# Patient Record
Sex: MALE | Race: WHITE
[De-identification: names, ages, dates, MRNs, and addresses within clinical notes are randomized per-mention and may not be internally consistent; named-entity substitution may affect disease eponyms.]

---

## 2021-03-28 ENCOUNTER — HOSPITAL ENCOUNTER (INPATIENT)
Dept: HOSPITAL 4 - SED | Age: 71
LOS: 5 days | Discharge: HOME HEALTH | DRG: 300 | End: 2021-04-02
Attending: FAMILY MEDICINE | Admitting: FAMILY MEDICINE
Payer: COMMERCIAL

## 2021-03-28 VITALS — SYSTOLIC BLOOD PRESSURE: 160 MMHG

## 2021-03-28 VITALS — HEIGHT: 65 IN | BODY MASS INDEX: 29.16 KG/M2 | WEIGHT: 175 LBS

## 2021-03-28 VITALS — SYSTOLIC BLOOD PRESSURE: 165 MMHG

## 2021-03-28 VITALS — SYSTOLIC BLOOD PRESSURE: 117 MMHG

## 2021-03-28 DIAGNOSIS — L03.114: ICD-10-CM

## 2021-03-28 DIAGNOSIS — Z82.49: ICD-10-CM

## 2021-03-28 DIAGNOSIS — Z20.822: ICD-10-CM

## 2021-03-28 DIAGNOSIS — I82.A12: Primary | ICD-10-CM

## 2021-03-28 DIAGNOSIS — Z79.899: ICD-10-CM

## 2021-03-28 DIAGNOSIS — M17.11: ICD-10-CM

## 2021-03-28 DIAGNOSIS — E11.9: ICD-10-CM

## 2021-03-28 DIAGNOSIS — I82.612: ICD-10-CM

## 2021-03-28 DIAGNOSIS — Z83.3: ICD-10-CM

## 2021-03-28 DIAGNOSIS — I10: ICD-10-CM

## 2021-03-28 LAB
ALBUMIN SERPL BCP-MCNC: 3.3 G/DL (ref 3.4–4.8)
ALT SERPL W P-5'-P-CCNC: 16 U/L (ref 12–78)
ANION GAP SERPL CALCULATED.3IONS-SCNC: 9 MMOL/L (ref 5–15)
AST SERPL W P-5'-P-CCNC: 13 U/L (ref 10–37)
BASOPHILS # BLD AUTO: 0.1 K/UL (ref 0–0.2)
BASOPHILS NFR BLD AUTO: 1 % (ref 0–2)
BILIRUB SERPL-MCNC: 0.4 MG/DL (ref 0–1)
BUN SERPL-MCNC: 15 MG/DL (ref 8–21)
CALCIUM SERPL-MCNC: 9 MG/DL (ref 8.4–11)
CHLORIDE SERPL-SCNC: 100 MMOL/L (ref 98–107)
CREAT SERPL-MCNC: 0.87 MG/DL (ref 0.55–1.3)
EOSINOPHIL # BLD AUTO: 0.4 K/UL (ref 0–0.4)
EOSINOPHIL NFR BLD AUTO: 4.2 % (ref 0–4)
ERYTHROCYTE [DISTWIDTH] IN BLOOD BY AUTOMATED COUNT: 16.3 % (ref 9–15)
GFR SERPL CREATININE-BSD FRML MDRD: (no result) ML/MIN (ref 90–?)
GLUCOSE SERPL-MCNC: 137 MG/DL (ref 70–99)
HCT VFR BLD AUTO: 39.6 % (ref 36–54)
HGB BLD-MCNC: 12.8 G/DL (ref 14–18)
INR PPP: 1 (ref 0.8–1.2)
LYMPHOCYTES # BLD AUTO: 1.6 K/UL (ref 1–5.5)
LYMPHOCYTES NFR BLD AUTO: 16 % (ref 20.5–51.5)
MCH RBC QN AUTO: 21 PG (ref 27–31)
MCHC RBC AUTO-ENTMCNC: 32 % (ref 32–36)
MCV RBC AUTO: 65 FL (ref 79–98)
MONOCYTES # BLD MANUAL: 1 K/UL (ref 0–1)
MONOCYTES # BLD MANUAL: 9.8 % (ref 1.7–9.3)
NEUTROPHILS # BLD AUTO: 7 K/UL (ref 1.8–7.7)
NEUTROPHILS NFR BLD AUTO: 69 % (ref 40–70)
PLATELET # BLD AUTO: 431 K/UL (ref 130–430)
POTASSIUM SERPL-SCNC: 4 MMOL/L (ref 3.5–5.1)
PROTHROMBIN TIME: 10.2 SECS (ref 9.5–12.5)
RBC # BLD AUTO: 6.09 MIL/UL (ref 4.2–6.2)
SODIUM SERPLBLD-SCNC: 135 MMOL/L (ref 136–145)
WBC # BLD AUTO: 10.1 K/UL (ref 4.8–10.8)

## 2021-03-28 PROCEDURE — G0378 HOSPITAL OBSERVATION PER HR: HCPCS

## 2021-03-28 RX ADMIN — TRAMADOL HYDROCHLORIDE PRN MG: 50 TABLET, FILM COATED ORAL at 22:28

## 2021-03-28 RX ADMIN — Medication SCH EA: at 18:00

## 2021-03-28 RX ADMIN — Medication SCH EA: at 23:55

## 2021-03-29 VITALS — SYSTOLIC BLOOD PRESSURE: 142 MMHG

## 2021-03-29 VITALS — SYSTOLIC BLOOD PRESSURE: 156 MMHG

## 2021-03-29 VITALS — SYSTOLIC BLOOD PRESSURE: 145 MMHG

## 2021-03-29 VITALS — SYSTOLIC BLOOD PRESSURE: 137 MMHG

## 2021-03-29 LAB
ALBUMIN SERPL BCP-MCNC: 3.2 G/DL (ref 3.4–4.8)
ALT SERPL W P-5'-P-CCNC: 22 U/L (ref 12–78)
ANION GAP SERPL CALCULATED.3IONS-SCNC: 11 MMOL/L (ref 5–15)
AST SERPL W P-5'-P-CCNC: 16 U/L (ref 10–37)
BASOPHILS # BLD AUTO: 0.1 K/UL (ref 0–0.2)
BASOPHILS NFR BLD AUTO: 1.3 % (ref 0–2)
BILIRUB SERPL-MCNC: 0.5 MG/DL (ref 0–1)
BUN SERPL-MCNC: 13 MG/DL (ref 8–21)
CALCIUM SERPL-MCNC: 8.6 MG/DL (ref 8.4–11)
CHLORIDE SERPL-SCNC: 100 MMOL/L (ref 98–107)
CHOLEST SERPL-MCNC: 107 MG/DL (ref ?–200)
CREAT SERPL-MCNC: 0.72 MG/DL (ref 0.55–1.3)
EOSINOPHIL # BLD AUTO: 0.5 K/UL (ref 0–0.4)
EOSINOPHIL NFR BLD AUTO: 4.6 % (ref 0–4)
ERYTHROCYTE [DISTWIDTH] IN BLOOD BY AUTOMATED COUNT: 16.2 % (ref 9–15)
GFR SERPL CREATININE-BSD FRML MDRD: (no result) ML/MIN (ref 90–?)
GLUCOSE SERPL-MCNC: 103 MG/DL (ref 70–99)
HCT VFR BLD AUTO: 42.3 % (ref 36–54)
HDLC SERPL-MCNC: 52 MG/DL (ref 45–?)
HGB BLD-MCNC: 13.3 G/DL (ref 14–18)
INR PPP: 1 (ref 0.8–1.2)
LDL CHOLESTEROL: 47 MG/DL (ref ?–100)
LYMPHOCYTES # BLD AUTO: 2.1 K/UL (ref 1–5.5)
LYMPHOCYTES NFR BLD AUTO: 18.8 % (ref 20.5–51.5)
MCH RBC QN AUTO: 21 PG (ref 27–31)
MCHC RBC AUTO-ENTMCNC: 31 % (ref 32–36)
MCV RBC AUTO: 65 FL (ref 79–98)
MONOCYTES # BLD MANUAL: 1 K/UL (ref 0–1)
MONOCYTES # BLD MANUAL: 8.5 % (ref 1.7–9.3)
NEUTROPHILS # BLD AUTO: 7.5 K/UL (ref 1.8–7.7)
NEUTROPHILS NFR BLD AUTO: 66.8 % (ref 40–70)
PLATELET # BLD AUTO: 429 K/UL (ref 130–430)
POTASSIUM SERPL-SCNC: 3.7 MMOL/L (ref 3.5–5.1)
PROTHROMBIN TIME: 10.2 SECS (ref 9.5–12.5)
RBC # BLD AUTO: 6.48 MIL/UL (ref 4.2–6.2)
SODIUM SERPLBLD-SCNC: 137 MMOL/L (ref 136–145)
TRIGL SERPL-MCNC: 102 MG/DL (ref 30–150)
TSH SERPL DL<=0.05 MIU/L-ACNC: 5.89 UIU/ML (ref 0.36–3.74)
WBC # BLD AUTO: 11.3 K/UL (ref 4.8–10.8)

## 2021-03-29 RX ADMIN — ENOXAPARIN SODIUM SCH MG: 60 INJECTION SUBCUTANEOUS at 20:17

## 2021-03-29 RX ADMIN — Medication SCH EA: at 17:41

## 2021-03-29 RX ADMIN — ENOXAPARIN SODIUM SCH MG: 60 INJECTION SUBCUTANEOUS at 08:50

## 2021-03-29 RX ADMIN — Medication SCH EA: at 11:44

## 2021-03-29 RX ADMIN — CILOSTAZOL SCH MG: 50 TABLET ORAL at 20:17

## 2021-03-29 RX ADMIN — TRAMADOL HYDROCHLORIDE PRN MG: 50 TABLET, FILM COATED ORAL at 07:51

## 2021-03-29 RX ADMIN — Medication SCH EA: at 06:39

## 2021-03-29 RX ADMIN — DEXTROSE MONOHYDRATE SCH MLS/HR: 50 INJECTION, SOLUTION INTRAVENOUS at 20:18

## 2021-03-29 RX ADMIN — Medication SCH EA: at 23:04

## 2021-03-29 RX ADMIN — NAPROXEN SCH MG: 250 TABLET ORAL at 20:15

## 2021-03-29 RX ADMIN — BACLOFEN SCH MG: 10 TABLET ORAL at 20:15

## 2021-03-29 RX ADMIN — MORPHINE SULFATE PRN MG: 2 INJECTION, SOLUTION INTRAMUSCULAR; INTRAVENOUS at 21:18

## 2021-03-30 VITALS — SYSTOLIC BLOOD PRESSURE: 131 MMHG

## 2021-03-30 VITALS — SYSTOLIC BLOOD PRESSURE: 113 MMHG

## 2021-03-30 VITALS — SYSTOLIC BLOOD PRESSURE: 145 MMHG

## 2021-03-30 VITALS — SYSTOLIC BLOOD PRESSURE: 139 MMHG

## 2021-03-30 VITALS — SYSTOLIC BLOOD PRESSURE: 134 MMHG

## 2021-03-30 VITALS — SYSTOLIC BLOOD PRESSURE: 151 MMHG

## 2021-03-30 RX ADMIN — ENOXAPARIN SODIUM SCH MG: 60 INJECTION SUBCUTANEOUS at 08:43

## 2021-03-30 RX ADMIN — BACLOFEN SCH MG: 10 TABLET ORAL at 20:07

## 2021-03-30 RX ADMIN — BACLOFEN SCH MG: 10 TABLET ORAL at 08:42

## 2021-03-30 RX ADMIN — NAPROXEN SCH MG: 250 TABLET ORAL at 20:07

## 2021-03-30 RX ADMIN — NAPROXEN SCH MG: 250 TABLET ORAL at 10:25

## 2021-03-30 RX ADMIN — Medication SCH EA: at 12:15

## 2021-03-30 RX ADMIN — Medication SCH EA: at 05:31

## 2021-03-30 RX ADMIN — Medication SCH MG: at 08:41

## 2021-03-30 RX ADMIN — Medication SCH EA: at 18:10

## 2021-03-30 RX ADMIN — BACLOFEN SCH MG: 10 TABLET ORAL at 15:46

## 2021-03-30 RX ADMIN — ENOXAPARIN SODIUM SCH MG: 60 INJECTION SUBCUTANEOUS at 20:09

## 2021-03-30 RX ADMIN — MORPHINE SULFATE PRN MG: 2 INJECTION, SOLUTION INTRAMUSCULAR; INTRAVENOUS at 16:23

## 2021-03-30 RX ADMIN — Medication SCH EA: at 23:37

## 2021-03-30 RX ADMIN — ATORVASTATIN CALCIUM SCH MG: 10 TABLET, FILM COATED ORAL at 08:42

## 2021-03-30 RX ADMIN — CILOSTAZOL SCH MG: 50 TABLET ORAL at 08:41

## 2021-03-30 RX ADMIN — ALLOPURINOL SCH MG: 100 TABLET ORAL at 08:42

## 2021-03-30 RX ADMIN — DEXTROSE MONOHYDRATE SCH MLS/HR: 50 INJECTION, SOLUTION INTRAVENOUS at 20:13

## 2021-03-30 RX ADMIN — CILOSTAZOL SCH MG: 50 TABLET ORAL at 20:08

## 2021-03-31 VITALS — SYSTOLIC BLOOD PRESSURE: 129 MMHG

## 2021-03-31 VITALS — SYSTOLIC BLOOD PRESSURE: 140 MMHG

## 2021-03-31 VITALS — SYSTOLIC BLOOD PRESSURE: 145 MMHG

## 2021-03-31 RX ADMIN — Medication SCH EA: at 23:51

## 2021-03-31 RX ADMIN — ALLOPURINOL SCH MG: 100 TABLET ORAL at 09:47

## 2021-03-31 RX ADMIN — Medication SCH EA: at 17:35

## 2021-03-31 RX ADMIN — BACLOFEN SCH MG: 10 TABLET ORAL at 09:46

## 2021-03-31 RX ADMIN — BACLOFEN SCH MG: 10 TABLET ORAL at 21:23

## 2021-03-31 RX ADMIN — DEXTROSE MONOHYDRATE SCH MLS/HR: 50 INJECTION, SOLUTION INTRAVENOUS at 21:22

## 2021-03-31 RX ADMIN — Medication SCH EA: at 05:46

## 2021-03-31 RX ADMIN — Medication SCH EA: at 12:00

## 2021-03-31 RX ADMIN — ATORVASTATIN CALCIUM SCH MG: 10 TABLET, FILM COATED ORAL at 09:45

## 2021-03-31 RX ADMIN — ENOXAPARIN SODIUM SCH MG: 60 INJECTION SUBCUTANEOUS at 09:51

## 2021-03-31 RX ADMIN — CILOSTAZOL SCH MG: 50 TABLET ORAL at 09:45

## 2021-03-31 RX ADMIN — ENOXAPARIN SODIUM SCH MG: 60 INJECTION SUBCUTANEOUS at 21:27

## 2021-03-31 RX ADMIN — BACLOFEN SCH MG: 10 TABLET ORAL at 17:34

## 2021-03-31 RX ADMIN — NAPROXEN SCH MG: 250 TABLET ORAL at 09:45

## 2021-03-31 RX ADMIN — CILOSTAZOL SCH MG: 50 TABLET ORAL at 21:22

## 2021-03-31 RX ADMIN — NAPROXEN SCH MG: 250 TABLET ORAL at 21:23

## 2021-03-31 RX ADMIN — Medication SCH MG: at 09:45

## 2021-04-01 VITALS — SYSTOLIC BLOOD PRESSURE: 146 MMHG

## 2021-04-01 VITALS — SYSTOLIC BLOOD PRESSURE: 112 MMHG

## 2021-04-01 VITALS — SYSTOLIC BLOOD PRESSURE: 132 MMHG

## 2021-04-01 VITALS — SYSTOLIC BLOOD PRESSURE: 130 MMHG

## 2021-04-01 VITALS — SYSTOLIC BLOOD PRESSURE: 150 MMHG

## 2021-04-01 RX ADMIN — Medication SCH EA: at 05:59

## 2021-04-01 RX ADMIN — BACLOFEN SCH MG: 10 TABLET ORAL at 09:04

## 2021-04-01 RX ADMIN — ENOXAPARIN SODIUM SCH MG: 60 INJECTION SUBCUTANEOUS at 09:07

## 2021-04-01 RX ADMIN — CILOSTAZOL SCH MG: 50 TABLET ORAL at 09:06

## 2021-04-01 RX ADMIN — Medication SCH EA: at 17:10

## 2021-04-01 RX ADMIN — NAPROXEN SCH MG: 250 TABLET ORAL at 09:04

## 2021-04-01 RX ADMIN — BACLOFEN SCH MG: 10 TABLET ORAL at 22:02

## 2021-04-01 RX ADMIN — BACLOFEN SCH MG: 10 TABLET ORAL at 16:30

## 2021-04-01 RX ADMIN — Medication SCH MG: at 09:05

## 2021-04-01 RX ADMIN — Medication SCH EA: at 12:58

## 2021-04-01 RX ADMIN — ATORVASTATIN CALCIUM SCH MG: 10 TABLET, FILM COATED ORAL at 09:05

## 2021-04-01 RX ADMIN — ALLOPURINOL SCH MG: 100 TABLET ORAL at 09:05

## 2021-04-01 RX ADMIN — APIXABAN SCH MG: 2.5 TABLET, FILM COATED ORAL at 21:00

## 2021-04-01 RX ADMIN — CILOSTAZOL SCH MG: 50 TABLET ORAL at 22:02

## 2021-04-01 RX ADMIN — DEXTROSE MONOHYDRATE SCH MLS/HR: 50 INJECTION, SOLUTION INTRAVENOUS at 22:00

## 2021-04-01 RX ADMIN — NAPROXEN SCH MG: 250 TABLET ORAL at 22:01

## 2021-04-02 VITALS — SYSTOLIC BLOOD PRESSURE: 134 MMHG

## 2021-04-02 VITALS — SYSTOLIC BLOOD PRESSURE: 142 MMHG

## 2021-04-02 VITALS — SYSTOLIC BLOOD PRESSURE: 140 MMHG

## 2021-04-02 VITALS — SYSTOLIC BLOOD PRESSURE: 149 MMHG

## 2021-04-02 LAB
ANION GAP SERPL CALCULATED.3IONS-SCNC: 11 MMOL/L (ref 5–15)
BASOPHILS # BLD AUTO: 0.1 K/UL (ref 0–0.2)
BASOPHILS NFR BLD AUTO: 1.1 % (ref 0–2)
BUN SERPL-MCNC: 13 MG/DL (ref 8–21)
CALCIUM SERPL-MCNC: 8.9 MG/DL (ref 8.4–11)
CHLORIDE SERPL-SCNC: 100 MMOL/L (ref 98–107)
CREAT SERPL-MCNC: 0.69 MG/DL (ref 0.55–1.3)
EOSINOPHIL # BLD AUTO: 0.5 K/UL (ref 0–0.4)
EOSINOPHIL NFR BLD AUTO: 4.8 % (ref 0–4)
ERYTHROCYTE [DISTWIDTH] IN BLOOD BY AUTOMATED COUNT: 16 % (ref 9–15)
GFR SERPL CREATININE-BSD FRML MDRD: (no result) ML/MIN (ref 90–?)
GLUCOSE SERPL-MCNC: 122 MG/DL (ref 70–99)
HCT VFR BLD AUTO: 41.8 % (ref 36–54)
HGB BLD-MCNC: 13.2 G/DL (ref 14–18)
LYMPHOCYTES # BLD AUTO: 2 K/UL (ref 1–5.5)
LYMPHOCYTES NFR BLD AUTO: 19.7 % (ref 20.5–51.5)
MCH RBC QN AUTO: 21 PG (ref 27–31)
MCHC RBC AUTO-ENTMCNC: 32 % (ref 32–36)
MCV RBC AUTO: 65 FL (ref 79–98)
MONOCYTES # BLD MANUAL: 0.9 K/UL (ref 0–1)
MONOCYTES # BLD MANUAL: 8.7 % (ref 1.7–9.3)
NEUTROPHILS # BLD AUTO: 6.6 K/UL (ref 1.8–7.7)
NEUTROPHILS NFR BLD AUTO: 65.7 % (ref 40–70)
PLATELET # BLD AUTO: 423 K/UL (ref 130–430)
POTASSIUM SERPL-SCNC: 4.2 MMOL/L (ref 3.5–5.1)
RBC # BLD AUTO: 6.44 MIL/UL (ref 4.2–6.2)
SODIUM SERPLBLD-SCNC: 136 MMOL/L (ref 136–145)
WBC # BLD AUTO: 10.1 K/UL (ref 4.8–10.8)

## 2021-04-02 RX ADMIN — ALLOPURINOL SCH MG: 100 TABLET ORAL at 09:20

## 2021-04-02 RX ADMIN — Medication SCH EA: at 05:54

## 2021-04-02 RX ADMIN — BACLOFEN SCH MG: 10 TABLET ORAL at 15:44

## 2021-04-02 RX ADMIN — NAPROXEN SCH MG: 250 TABLET ORAL at 09:23

## 2021-04-02 RX ADMIN — Medication SCH EA: at 00:06

## 2021-04-02 RX ADMIN — Medication SCH EA: at 11:22

## 2021-04-02 RX ADMIN — Medication SCH EA: at 17:47

## 2021-04-02 RX ADMIN — Medication SCH MG: at 09:20

## 2021-04-02 RX ADMIN — BACLOFEN SCH MG: 10 TABLET ORAL at 09:20

## 2021-04-02 RX ADMIN — CILOSTAZOL SCH MG: 50 TABLET ORAL at 09:25

## 2021-04-02 RX ADMIN — APIXABAN SCH MG: 2.5 TABLET, FILM COATED ORAL at 09:26

## 2021-04-02 RX ADMIN — ATORVASTATIN CALCIUM SCH MG: 10 TABLET, FILM COATED ORAL at 09:20

## 2022-02-09 PROCEDURE — 5A09357 ASSISTANCE WITH RESPIRATORY VENTILATION, LESS THAN 24 CONSECUTIVE HOURS, CONTINUOUS POSITIVE AIRWAY PRESSURE: ICD-10-PCS

## 2022-02-11 ENCOUNTER — HOSPITAL ENCOUNTER (INPATIENT)
Dept: HOSPITAL 4 - SED | Age: 72
LOS: 18 days | Discharge: NURSING, REHAB OR LONG-TERM CARE | DRG: 870 | End: 2022-03-01
Attending: FAMILY MEDICINE | Admitting: FAMILY MEDICINE
Payer: COMMERCIAL

## 2022-02-11 VITALS — SYSTOLIC BLOOD PRESSURE: 105 MMHG

## 2022-02-11 VITALS — SYSTOLIC BLOOD PRESSURE: 80 MMHG

## 2022-02-11 VITALS — SYSTOLIC BLOOD PRESSURE: 125 MMHG

## 2022-02-11 VITALS — SYSTOLIC BLOOD PRESSURE: 96 MMHG

## 2022-02-11 VITALS — SYSTOLIC BLOOD PRESSURE: 69 MMHG

## 2022-02-11 VITALS — SYSTOLIC BLOOD PRESSURE: 108 MMHG

## 2022-02-11 VITALS — SYSTOLIC BLOOD PRESSURE: 41 MMHG

## 2022-02-11 VITALS — SYSTOLIC BLOOD PRESSURE: 52 MMHG

## 2022-02-11 VITALS — SYSTOLIC BLOOD PRESSURE: 135 MMHG

## 2022-02-11 VITALS — SYSTOLIC BLOOD PRESSURE: 133 MMHG

## 2022-02-11 VITALS — SYSTOLIC BLOOD PRESSURE: 93 MMHG

## 2022-02-11 VITALS — SYSTOLIC BLOOD PRESSURE: 120 MMHG

## 2022-02-11 VITALS — SYSTOLIC BLOOD PRESSURE: 103 MMHG

## 2022-02-11 VITALS — SYSTOLIC BLOOD PRESSURE: 160 MMHG

## 2022-02-11 VITALS — SYSTOLIC BLOOD PRESSURE: 119 MMHG

## 2022-02-11 VITALS — WEIGHT: 142.38 LBS | HEIGHT: 65 IN | BODY MASS INDEX: 23.72 KG/M2

## 2022-02-11 VITALS — SYSTOLIC BLOOD PRESSURE: 111 MMHG

## 2022-02-11 VITALS — SYSTOLIC BLOOD PRESSURE: 90 MMHG

## 2022-02-11 DIAGNOSIS — M06.9: ICD-10-CM

## 2022-02-11 DIAGNOSIS — J96.01: ICD-10-CM

## 2022-02-11 DIAGNOSIS — U07.1: ICD-10-CM

## 2022-02-11 DIAGNOSIS — Z93.1: ICD-10-CM

## 2022-02-11 DIAGNOSIS — I11.0: ICD-10-CM

## 2022-02-11 DIAGNOSIS — Z79.82: ICD-10-CM

## 2022-02-11 DIAGNOSIS — J12.82: ICD-10-CM

## 2022-02-11 DIAGNOSIS — Z79.899: ICD-10-CM

## 2022-02-11 DIAGNOSIS — I50.9: ICD-10-CM

## 2022-02-11 DIAGNOSIS — I42.0: ICD-10-CM

## 2022-02-11 DIAGNOSIS — E11.42: ICD-10-CM

## 2022-02-11 DIAGNOSIS — M10.9: ICD-10-CM

## 2022-02-11 DIAGNOSIS — H40.9: ICD-10-CM

## 2022-02-11 DIAGNOSIS — Z86.718: ICD-10-CM

## 2022-02-11 DIAGNOSIS — A41.9: Primary | ICD-10-CM

## 2022-02-11 DIAGNOSIS — J10.08: ICD-10-CM

## 2022-02-11 DIAGNOSIS — Z95.810: ICD-10-CM

## 2022-02-11 DIAGNOSIS — R65.21: ICD-10-CM

## 2022-02-11 LAB
ALBUMIN SERPL BCP-MCNC: 2.2 G/DL (ref 3.4–4.8)
ALT SERPL W P-5'-P-CCNC: 23 U/L (ref 12–78)
ANION GAP SERPL CALCULATED.3IONS-SCNC: 14 MMOL/L (ref 5–15)
AST SERPL W P-5'-P-CCNC: 29 U/L (ref 10–37)
BASOPHILS # BLD AUTO: 0.1 K/UL (ref 0–0.2)
BASOPHILS NFR BLD AUTO: 0.7 % (ref 0–2)
BILIRUB SERPL-MCNC: 0.6 MG/DL (ref 0–1)
BUN SERPL-MCNC: 24 MG/DL (ref 8–21)
CALCIUM SERPL-MCNC: 9.4 MG/DL (ref 8.4–11)
CHLORIDE SERPL-SCNC: 94 MMOL/L (ref 98–107)
CREAT SERPL-MCNC: 1.04 MG/DL (ref 0.55–1.3)
EOSINOPHIL # BLD AUTO: 0.1 K/UL (ref 0–0.4)
EOSINOPHIL NFR BLD AUTO: 0.5 % (ref 0–4)
ERYTHROCYTE [DISTWIDTH] IN BLOOD BY AUTOMATED COUNT: 21.1 % (ref 9–15)
GFR SERPL CREATININE-BSD FRML MDRD: (no result) ML/MIN (ref 90–?)
GLUCOSE SERPL-MCNC: 169 MG/DL (ref 70–99)
HCT VFR BLD AUTO: 36 % (ref 36–54)
HGB BLD-MCNC: 11.2 G/DL (ref 14–18)
INR PPP: 1 (ref 0.8–1.2)
LYMPHOCYTES # BLD AUTO: 2.6 K/UL (ref 1–5.5)
LYMPHOCYTES NFR BLD AUTO: 15 % (ref 20.5–51.5)
MCH RBC QN AUTO: 21 PG (ref 27–31)
MCHC RBC AUTO-ENTMCNC: 31 % (ref 32–36)
MCV RBC AUTO: 68 FL (ref 79–98)
MONOCYTES # BLD MANUAL: 0.4 K/UL (ref 0–1)
MONOCYTES # BLD MANUAL: 2.1 % (ref 1.7–9.3)
NEUTROPHILS # BLD AUTO: 14 K/UL (ref 1.8–7.7)
NEUTROPHILS NFR BLD AUTO: 81.7 % (ref 40–70)
PLATELET # BLD AUTO: 450 K/UL (ref 130–430)
POTASSIUM SERPL-SCNC: 5.1 MMOL/L (ref 3.5–5.1)
PROTHROMBIN TIME: 10.8 SECS (ref 9.5–12.5)
RBC # BLD AUTO: 5.32 MIL/UL (ref 4.2–6.2)
SODIUM SERPLBLD-SCNC: 128 MMOL/L (ref 136–145)
WBC # BLD AUTO: 17.1 K/UL (ref 4.8–10.8)

## 2022-02-11 PROCEDURE — 05HM33Z INSERTION OF INFUSION DEVICE INTO RIGHT INTERNAL JUGULAR VEIN, PERCUTANEOUS APPROACH: ICD-10-PCS

## 2022-02-11 PROCEDURE — P9046 ALBUMIN (HUMAN), 25%, 20 ML: HCPCS

## 2022-02-11 PROCEDURE — C9113 INJ PANTOPRAZOLE SODIUM, VIA: HCPCS

## 2022-02-11 PROCEDURE — 0W9B30Z DRAINAGE OF LEFT PLEURAL CAVITY WITH DRAINAGE DEVICE, PERCUTANEOUS APPROACH: ICD-10-PCS

## 2022-02-11 PROCEDURE — 0BH17EZ INSERTION OF ENDOTRACHEAL AIRWAY INTO TRACHEA, VIA NATURAL OR ARTIFICIAL OPENING: ICD-10-PCS

## 2022-02-11 PROCEDURE — 0D9670Z DRAINAGE OF STOMACH WITH DRAINAGE DEVICE, VIA NATURAL OR ARTIFICIAL OPENING: ICD-10-PCS

## 2022-02-11 PROCEDURE — G9035 OSELTAMIVIR PHOSP, BRAND: HCPCS

## 2022-02-11 PROCEDURE — 5A1955Z RESPIRATORY VENTILATION, GREATER THAN 96 CONSECUTIVE HOURS: ICD-10-PCS

## 2022-02-11 PROCEDURE — XW043H5 INTRODUCTION OF TOCILIZUMAB INTO CENTRAL VEIN, PERCUTANEOUS APPROACH, NEW TECHNOLOGY GROUP 5: ICD-10-PCS

## 2022-02-11 PROCEDURE — B543ZZA ULTRASONOGRAPHY OF RIGHT JUGULAR VEINS, GUIDANCE: ICD-10-PCS

## 2022-02-11 RX ADMIN — METHYLPREDNISOLONE SCH MG: 40 INJECTION, POWDER, LYOPHILIZED, FOR SOLUTION INTRAMUSCULAR; INTRAVENOUS at 18:26

## 2022-02-11 RX ADMIN — METHYLPREDNISOLONE SCH MG: 40 INJECTION, POWDER, LYOPHILIZED, FOR SOLUTION INTRAMUSCULAR; INTRAVENOUS at 22:00

## 2022-02-11 RX ADMIN — ALBUMIN (HUMAN) SCH MLS/HR: 0.25 INJECTION, SOLUTION INTRAVENOUS at 21:45

## 2022-02-11 RX ADMIN — SODIUM CHLORIDE SCH MLS/HR: 9 INJECTION, SOLUTION INTRAVENOUS at 19:00

## 2022-02-11 RX ADMIN — DEXTROSE MONOHYDRATE SCH MLS/HR: 50 INJECTION, SOLUTION INTRAVENOUS at 18:24

## 2022-02-11 NOTE — NUR
DR KARIMI INTUBATED PT USING ET TUBE SIZE 7.5, R.T. AT BEDSIDE BAGGING PT, WHILE 
VENT MACHINE BEING SET UP.

## 2022-02-11 NOTE — NUR
RIGHT JUGULAR CENTRAL LINE INSERTED BY DR KARIMI. BIOPATCH TO SITE IN PLACED, 
TRANSPARENT DRESSING TO COVER.

## 2022-02-11 NOTE — NUR
Pt BIBA re SOB, 50's saturation on room air at home. Pt was seen at [facility] yesterday and newly diagnosed with CHF. Arrived on Bipap mask with 
sats only in 60's. NTG in field was minimally effective. Pt awake, alert and 
oriented x 3. Able to speak in short sentences. Awaiting MD castillo. 

-------------------------------------------------------------------------------

Addendum: 02/11/22 at 1006 by SDEDJOPAL

-------------------------------------------------------------------------------

*RT called to bedside immediately upon pt arrival re probable intubation

## 2022-02-11 NOTE — NUR
Pt triaged and placed in room 2 for evaluation. Report given to Ritu GAGNON to 
assume care of patient.

## 2022-02-11 NOTE — NUR
CONSULTATION PAGED/CALLED

Reason for Consultation: COVID

Person Who was Notified: FERNIE

Consulting Physician: JOAO

Consultant Specialty: 

Ordering Physician: ZACKERY

## 2022-02-11 NOTE — NUR
Patient transported to ICU rm 127B on hospital bed with RT and ICU RN Navarro Chaney 
(primary RN) to take chart to unit.

## 2022-02-12 VITALS — SYSTOLIC BLOOD PRESSURE: 124 MMHG

## 2022-02-12 VITALS — SYSTOLIC BLOOD PRESSURE: 123 MMHG

## 2022-02-12 VITALS — SYSTOLIC BLOOD PRESSURE: 150 MMHG

## 2022-02-12 VITALS — SYSTOLIC BLOOD PRESSURE: 110 MMHG

## 2022-02-12 VITALS — SYSTOLIC BLOOD PRESSURE: 116 MMHG

## 2022-02-12 VITALS — SYSTOLIC BLOOD PRESSURE: 142 MMHG

## 2022-02-12 VITALS — SYSTOLIC BLOOD PRESSURE: 120 MMHG

## 2022-02-12 VITALS — SYSTOLIC BLOOD PRESSURE: 147 MMHG

## 2022-02-12 VITALS — SYSTOLIC BLOOD PRESSURE: 144 MMHG

## 2022-02-12 VITALS — SYSTOLIC BLOOD PRESSURE: 122 MMHG

## 2022-02-12 VITALS — SYSTOLIC BLOOD PRESSURE: 166 MMHG

## 2022-02-12 VITALS — SYSTOLIC BLOOD PRESSURE: 140 MMHG

## 2022-02-12 VITALS — SYSTOLIC BLOOD PRESSURE: 95 MMHG

## 2022-02-12 VITALS — SYSTOLIC BLOOD PRESSURE: 133 MMHG

## 2022-02-12 VITALS — SYSTOLIC BLOOD PRESSURE: 125 MMHG

## 2022-02-12 VITALS — SYSTOLIC BLOOD PRESSURE: 98 MMHG

## 2022-02-12 VITALS — SYSTOLIC BLOOD PRESSURE: 128 MMHG

## 2022-02-12 VITALS — SYSTOLIC BLOOD PRESSURE: 115 MMHG

## 2022-02-12 VITALS — SYSTOLIC BLOOD PRESSURE: 129 MMHG

## 2022-02-12 VITALS — SYSTOLIC BLOOD PRESSURE: 118 MMHG

## 2022-02-12 VITALS — SYSTOLIC BLOOD PRESSURE: 117 MMHG

## 2022-02-12 VITALS — SYSTOLIC BLOOD PRESSURE: 126 MMHG

## 2022-02-12 VITALS — SYSTOLIC BLOOD PRESSURE: 121 MMHG

## 2022-02-12 VITALS — SYSTOLIC BLOOD PRESSURE: 134 MMHG

## 2022-02-12 VITALS — SYSTOLIC BLOOD PRESSURE: 114 MMHG

## 2022-02-12 LAB
ALBUMIN SERPL BCP-MCNC: 1.9 G/DL (ref 3.4–4.8)
ALT SERPL W P-5'-P-CCNC: 23 U/L (ref 12–78)
ANION GAP SERPL CALCULATED.3IONS-SCNC: 15 MMOL/L (ref 5–15)
AST SERPL W P-5'-P-CCNC: 23 U/L (ref 10–37)
BASOPHILS # BLD AUTO: 0 K/UL (ref 0–0.2)
BASOPHILS NFR BLD AUTO: 0.2 % (ref 0–2)
BILIRUB SERPL-MCNC: 0.5 MG/DL (ref 0–1)
BUN SERPL-MCNC: 33 MG/DL (ref 8–21)
CALCIUM SERPL-MCNC: 8.5 MG/DL (ref 8.4–11)
CHLORIDE SERPL-SCNC: 98 MMOL/L (ref 98–107)
CHOLEST SERPL-MCNC: 97 MG/DL (ref ?–200)
CREAT SERPL-MCNC: 1.28 MG/DL (ref 0.55–1.3)
CRP SERPL-MCNC: 26.2 MG/DL (ref 0–0.5)
EOSINOPHIL # BLD AUTO: 0 K/UL (ref 0–0.4)
EOSINOPHIL NFR BLD AUTO: 0 % (ref 0–4)
ERYTHROCYTE [DISTWIDTH] IN BLOOD BY AUTOMATED COUNT: 20.5 % (ref 9–15)
GFR SERPL CREATININE-BSD FRML MDRD: (no result) ML/MIN (ref 90–?)
GLUCOSE SERPL-MCNC: 216 MG/DL (ref 70–99)
HCT VFR BLD AUTO: 31.3 % (ref 36–54)
HDLC SERPL-MCNC: 12 MG/DL (ref 45–?)
HGB BLD-MCNC: 9.8 G/DL (ref 14–18)
LDL CHOLESTEROL: 46 MG/DL (ref ?–100)
LYMPHOCYTES # BLD AUTO: 1.8 K/UL (ref 1–5.5)
LYMPHOCYTES NFR BLD AUTO: 10.7 % (ref 20.5–51.5)
MCH RBC QN AUTO: 21 PG (ref 27–31)
MCHC RBC AUTO-ENTMCNC: 31 % (ref 32–36)
MCV RBC AUTO: 68 FL (ref 79–98)
MONOCYTES # BLD MANUAL: 0.6 K/UL (ref 0–1)
MONOCYTES # BLD MANUAL: 3.7 % (ref 1.7–9.3)
NEUTROPHILS # BLD AUTO: 14.3 K/UL (ref 1.8–7.7)
NEUTROPHILS NFR BLD AUTO: 85.4 % (ref 40–70)
PLATELET # BLD AUTO: 387 K/UL (ref 130–430)
POTASSIUM SERPL-SCNC: 4.4 MMOL/L (ref 3.5–5.1)
RBC # BLD AUTO: 4.64 MIL/UL (ref 4.2–6.2)
SODIUM SERPLBLD-SCNC: 133 MMOL/L (ref 136–145)
TRIGL SERPL-MCNC: 198 MG/DL (ref 30–150)
TSH SERPL DL<=0.05 MIU/L-ACNC: 1.35 UIU/ML (ref 0.36–3.74)
WBC # BLD AUTO: 16.8 K/UL (ref 4.8–10.8)

## 2022-02-12 RX ADMIN — ALBUTEROL SULFATE SCH PUFFS: 90 AEROSOL, METERED RESPIRATORY (INHALATION) at 11:43

## 2022-02-12 RX ADMIN — SODIUM CHLORIDE SCH MLS/HR: 9 INJECTION, SOLUTION INTRAVENOUS at 09:21

## 2022-02-12 RX ADMIN — SODIUM CHLORIDE SCH MG: 9 INJECTION, SOLUTION INTRAVENOUS at 09:21

## 2022-02-12 RX ADMIN — METHYLPREDNISOLONE SCH MG: 40 INJECTION, POWDER, LYOPHILIZED, FOR SOLUTION INTRAMUSCULAR; INTRAVENOUS at 05:00

## 2022-02-12 RX ADMIN — SACUBITRIL AND VALSARTAN SCH TABLET: 24; 26 TABLET, FILM COATED ORAL at 09:45

## 2022-02-12 RX ADMIN — ENOXAPARIN SODIUM SCH MG: 40 INJECTION SUBCUTANEOUS at 09:23

## 2022-02-12 RX ADMIN — FUROSEMIDE SCH MG: 10 INJECTION, SOLUTION INTRAMUSCULAR; INTRAVENOUS at 09:21

## 2022-02-12 RX ADMIN — DEXTROSE MONOHYDRATE SCH MLS/HR: 50 INJECTION, SOLUTION INTRAVENOUS at 11:52

## 2022-02-12 RX ADMIN — PROPOFOL PRN MLS/HR: 10 INJECTION, EMULSION INTRAVENOUS at 13:36

## 2022-02-12 RX ADMIN — METHYLPREDNISOLONE SCH MG: 40 INJECTION, POWDER, LYOPHILIZED, FOR SOLUTION INTRAMUSCULAR; INTRAVENOUS at 21:00

## 2022-02-12 RX ADMIN — PROPOFOL PRN MLS/HR: 10 INJECTION, EMULSION INTRAVENOUS at 17:59

## 2022-02-12 RX ADMIN — OXYCODONE HYDROCHLORIDE AND ACETAMINOPHEN SCH MG: 500 TABLET ORAL at 09:22

## 2022-02-12 RX ADMIN — DEXTROSE MONOHYDRATE SCH MLS/HR: 50 INJECTION, SOLUTION INTRAVENOUS at 18:01

## 2022-02-12 RX ADMIN — DEXTROSE MONOHYDRATE SCH MLS/HR: 50 INJECTION, SOLUTION INTRAVENOUS at 04:59

## 2022-02-12 RX ADMIN — SODIUM CHLORIDE SCH MLS/HR: 9 INJECTION, SOLUTION INTRAVENOUS at 21:00

## 2022-02-12 RX ADMIN — METHYLPREDNISOLONE SCH MG: 40 INJECTION, POWDER, LYOPHILIZED, FOR SOLUTION INTRAMUSCULAR; INTRAVENOUS at 13:54

## 2022-02-12 RX ADMIN — Medication SCH UNIT: at 09:22

## 2022-02-12 RX ADMIN — SACUBITRIL AND VALSARTAN SCH TABLET: 24; 26 TABLET, FILM COATED ORAL at 21:00

## 2022-02-12 RX ADMIN — ALBUTEROL SULFATE SCH PUFFS: 90 AEROSOL, METERED RESPIRATORY (INHALATION) at 10:42

## 2022-02-12 RX ADMIN — DEXTROSE MONOHYDRATE SCH MLS/HR: 50 INJECTION, SOLUTION INTRAVENOUS at 00:00

## 2022-02-12 RX ADMIN — ALBUTEROL SULFATE SCH PUFFS: 90 AEROSOL, METERED RESPIRATORY (INHALATION) at 20:00

## 2022-02-12 RX ADMIN — ALBUMIN (HUMAN) SCH MLS/HR: 0.25 INJECTION, SOLUTION INTRAVENOUS at 01:45

## 2022-02-12 NOTE — NUR
Recd pt sedated on diprivan gtt at 25mcg/kg/min, oral ETT to vent SC 20, tv 450, 75% fio2,  
+5, with sats 97%. SR at 61. OGT clamped for meds, F/c with yellow clear urine. AM 
assessment done and charted. Attempted to wean off levophed because BP was 166/82, down to 
0.2mcg/kg/min, now /66. Will continue to monitor pt.

## 2022-02-12 NOTE — NUR
Dietitian Recommendations



* Consider initiation of EN support within 1-2 days

* Vital AF 1.2 at 40 ml/hr; if bolus feedin Q6h (960 kcal/day), Free Water Flush: per 
physician d/t CHF via OGT  

Provides (w/ current propofol infusion rate): 1495 kcal/day, 72 gm protein/day, and 779 ml 
free water/day

Meets: 94% of estimated caloric needs and 75% of lower end of estimated nutritional needs

LP, RD



Please refer to Nutrition Assessment for details.

-------------------------------------------------------------------------------

Addendum: 22 at 1445 by Melissa Wilcox RD

-------------------------------------------------------------------------------

Amended: Links added.

## 2022-02-12 NOTE — NUR
Nutrition Update



Dwayne Scale 9 noted.

Pt admitted for CHF, influenza, COVID.

Diet: N/A

BMI: 23.5 kg/m2



RD to follow per nutrition care standards.

## 2022-02-12 NOTE — NUR
Notified Dr Kapoor regarding ABG results from this morning, obtained order to decrease 
Fi02, see order. Will continue to monitor pt closely.

## 2022-02-13 VITALS — SYSTOLIC BLOOD PRESSURE: 102 MMHG

## 2022-02-13 VITALS — SYSTOLIC BLOOD PRESSURE: 103 MMHG

## 2022-02-13 VITALS — SYSTOLIC BLOOD PRESSURE: 104 MMHG

## 2022-02-13 VITALS — SYSTOLIC BLOOD PRESSURE: 123 MMHG

## 2022-02-13 VITALS — SYSTOLIC BLOOD PRESSURE: 134 MMHG

## 2022-02-13 VITALS — SYSTOLIC BLOOD PRESSURE: 101 MMHG

## 2022-02-13 VITALS — SYSTOLIC BLOOD PRESSURE: 95 MMHG

## 2022-02-13 VITALS — SYSTOLIC BLOOD PRESSURE: 139 MMHG

## 2022-02-13 VITALS — SYSTOLIC BLOOD PRESSURE: 96 MMHG

## 2022-02-13 VITALS — SYSTOLIC BLOOD PRESSURE: 107 MMHG

## 2022-02-13 VITALS — SYSTOLIC BLOOD PRESSURE: 99 MMHG

## 2022-02-13 VITALS — SYSTOLIC BLOOD PRESSURE: 98 MMHG

## 2022-02-13 VITALS — SYSTOLIC BLOOD PRESSURE: 148 MMHG

## 2022-02-13 VITALS — SYSTOLIC BLOOD PRESSURE: 136 MMHG

## 2022-02-13 VITALS — SYSTOLIC BLOOD PRESSURE: 142 MMHG

## 2022-02-13 VITALS — SYSTOLIC BLOOD PRESSURE: 89 MMHG

## 2022-02-13 VITALS — SYSTOLIC BLOOD PRESSURE: 125 MMHG

## 2022-02-13 VITALS — SYSTOLIC BLOOD PRESSURE: 92 MMHG

## 2022-02-13 VITALS — SYSTOLIC BLOOD PRESSURE: 90 MMHG

## 2022-02-13 LAB
ALBUMIN SERPL BCP-MCNC: 1.6 G/DL (ref 3.4–4.8)
ALT SERPL W P-5'-P-CCNC: 16 U/L (ref 12–78)
ANION GAP SERPL CALCULATED.3IONS-SCNC: 12 MMOL/L (ref 5–15)
AST SERPL W P-5'-P-CCNC: 16 U/L (ref 10–37)
BASOPHILS # BLD AUTO: 0 K/UL (ref 0–0.2)
BASOPHILS NFR BLD AUTO: 0.1 % (ref 0–2)
BILIRUB SERPL-MCNC: 0.2 MG/DL (ref 0–1)
BUN SERPL-MCNC: 27 MG/DL (ref 8–21)
CALCIUM SERPL-MCNC: 8 MG/DL (ref 8.4–11)
CHLORIDE SERPL-SCNC: 105 MMOL/L (ref 98–107)
CREAT SERPL-MCNC: 0.79 MG/DL (ref 0.55–1.3)
EOSINOPHIL # BLD AUTO: 0 K/UL (ref 0–0.4)
EOSINOPHIL NFR BLD AUTO: 0 % (ref 0–4)
ERYTHROCYTE [DISTWIDTH] IN BLOOD BY AUTOMATED COUNT: 20.9 % (ref 9–15)
GFR SERPL CREATININE-BSD FRML MDRD: (no result) ML/MIN (ref 90–?)
GLUCOSE SERPL-MCNC: 161 MG/DL (ref 70–99)
HCT VFR BLD AUTO: 27.9 % (ref 36–54)
HGB BLD-MCNC: 8.7 G/DL (ref 14–18)
LYMPHOCYTES # BLD AUTO: 2 K/UL (ref 1–5.5)
LYMPHOCYTES NFR BLD AUTO: 14 % (ref 20.5–51.5)
MCH RBC QN AUTO: 21 PG (ref 27–31)
MCHC RBC AUTO-ENTMCNC: 31 % (ref 32–36)
MCV RBC AUTO: 67 FL (ref 79–98)
MONOCYTES # BLD MANUAL: 0.9 K/UL (ref 0–1)
MONOCYTES # BLD MANUAL: 6.2 % (ref 1.7–9.3)
NEUTROPHILS # BLD AUTO: 11.2 K/UL (ref 1.8–7.7)
NEUTROPHILS NFR BLD AUTO: 79.7 % (ref 40–70)
PLATELET # BLD AUTO: 364 K/UL (ref 130–430)
POTASSIUM SERPL-SCNC: 4.1 MMOL/L (ref 3.5–5.1)
RBC # BLD AUTO: 4.15 MIL/UL (ref 4.2–6.2)
SODIUM SERPLBLD-SCNC: 138 MMOL/L (ref 136–145)
WBC # BLD AUTO: 14.1 K/UL (ref 4.8–10.8)

## 2022-02-13 PROCEDURE — XW033H5 INTRODUCTION OF TOCILIZUMAB INTO PERIPHERAL VEIN, PERCUTANEOUS APPROACH, NEW TECHNOLOGY GROUP 5: ICD-10-PCS | Performed by: FAMILY MEDICINE

## 2022-02-13 RX ADMIN — OXYCODONE HYDROCHLORIDE AND ACETAMINOPHEN SCH MG: 500 TABLET ORAL at 09:43

## 2022-02-13 RX ADMIN — DEXTROSE MONOHYDRATE SCH MLS/HR: 50 INJECTION, SOLUTION INTRAVENOUS at 05:49

## 2022-02-13 RX ADMIN — PROPOFOL PRN MLS/HR: 10 INJECTION, EMULSION INTRAVENOUS at 08:40

## 2022-02-13 RX ADMIN — SACUBITRIL AND VALSARTAN SCH TABLET: 24; 26 TABLET, FILM COATED ORAL at 09:00

## 2022-02-13 RX ADMIN — ALBUTEROL SULFATE SCH PUFFS: 90 AEROSOL, METERED RESPIRATORY (INHALATION) at 11:00

## 2022-02-13 RX ADMIN — PROPOFOL PRN MLS/HR: 10 INJECTION, EMULSION INTRAVENOUS at 05:48

## 2022-02-13 RX ADMIN — FUROSEMIDE SCH MG: 10 INJECTION, SOLUTION INTRAMUSCULAR; INTRAVENOUS at 09:39

## 2022-02-13 RX ADMIN — SODIUM CHLORIDE SCH MG: 9 INJECTION, SOLUTION INTRAVENOUS at 09:39

## 2022-02-13 RX ADMIN — ALBUTEROL SULFATE SCH PUFFS: 90 AEROSOL, METERED RESPIRATORY (INHALATION) at 00:08

## 2022-02-13 RX ADMIN — ALBUTEROL SULFATE SCH PUFFS: 90 AEROSOL, METERED RESPIRATORY (INHALATION) at 05:46

## 2022-02-13 RX ADMIN — METHYLPREDNISOLONE SCH MG: 40 INJECTION, POWDER, LYOPHILIZED, FOR SOLUTION INTRAMUSCULAR; INTRAVENOUS at 22:00

## 2022-02-13 RX ADMIN — DEXTROSE MONOHYDRATE SCH MLS/HR: 50 INJECTION, SOLUTION INTRAVENOUS at 16:54

## 2022-02-13 RX ADMIN — SACUBITRIL AND VALSARTAN SCH TABLET: 24; 26 TABLET, FILM COATED ORAL at 22:00

## 2022-02-13 RX ADMIN — ALBUTEROL SULFATE SCH PUFFS: 90 AEROSOL, METERED RESPIRATORY (INHALATION) at 23:59

## 2022-02-13 RX ADMIN — DEXTROSE MONOHYDRATE SCH MLS/HR: 50 INJECTION, SOLUTION INTRAVENOUS at 13:17

## 2022-02-13 RX ADMIN — DEXTROSE MONOHYDRATE SCH MLS/HR: 50 INJECTION, SOLUTION INTRAVENOUS at 00:00

## 2022-02-13 RX ADMIN — ALBUTEROL SULFATE SCH PUFFS: 90 AEROSOL, METERED RESPIRATORY (INHALATION) at 19:40

## 2022-02-13 RX ADMIN — METHYLPREDNISOLONE SCH MG: 40 INJECTION, POWDER, LYOPHILIZED, FOR SOLUTION INTRAMUSCULAR; INTRAVENOUS at 14:06

## 2022-02-13 RX ADMIN — PROPOFOL PRN MLS/HR: 10 INJECTION, EMULSION INTRAVENOUS at 16:53

## 2022-02-13 RX ADMIN — SODIUM CHLORIDE SCH MLS/HR: 9 INJECTION, SOLUTION INTRAVENOUS at 16:53

## 2022-02-13 RX ADMIN — PROPOFOL PRN MLS/HR: 10 INJECTION, EMULSION INTRAVENOUS at 02:10

## 2022-02-13 RX ADMIN — Medication SCH UNIT: at 09:43

## 2022-02-13 RX ADMIN — ENOXAPARIN SODIUM SCH MG: 40 INJECTION SUBCUTANEOUS at 09:44

## 2022-02-13 RX ADMIN — METHYLPREDNISOLONE SCH MG: 40 INJECTION, POWDER, LYOPHILIZED, FOR SOLUTION INTRAMUSCULAR; INTRAVENOUS at 06:46

## 2022-02-14 VITALS — SYSTOLIC BLOOD PRESSURE: 144 MMHG

## 2022-02-14 VITALS — SYSTOLIC BLOOD PRESSURE: 136 MMHG

## 2022-02-14 VITALS — SYSTOLIC BLOOD PRESSURE: 124 MMHG

## 2022-02-14 VITALS — SYSTOLIC BLOOD PRESSURE: 142 MMHG

## 2022-02-14 VITALS — SYSTOLIC BLOOD PRESSURE: 119 MMHG

## 2022-02-14 VITALS — SYSTOLIC BLOOD PRESSURE: 147 MMHG

## 2022-02-14 VITALS — SYSTOLIC BLOOD PRESSURE: 130 MMHG

## 2022-02-14 VITALS — SYSTOLIC BLOOD PRESSURE: 145 MMHG

## 2022-02-14 VITALS — SYSTOLIC BLOOD PRESSURE: 143 MMHG

## 2022-02-14 VITALS — SYSTOLIC BLOOD PRESSURE: 121 MMHG

## 2022-02-14 VITALS — SYSTOLIC BLOOD PRESSURE: 149 MMHG

## 2022-02-14 VITALS — SYSTOLIC BLOOD PRESSURE: 138 MMHG

## 2022-02-14 VITALS — SYSTOLIC BLOOD PRESSURE: 131 MMHG

## 2022-02-14 VITALS — SYSTOLIC BLOOD PRESSURE: 126 MMHG

## 2022-02-14 VITALS — SYSTOLIC BLOOD PRESSURE: 151 MMHG

## 2022-02-14 VITALS — SYSTOLIC BLOOD PRESSURE: 115 MMHG

## 2022-02-14 VITALS — SYSTOLIC BLOOD PRESSURE: 133 MMHG

## 2022-02-14 VITALS — SYSTOLIC BLOOD PRESSURE: 128 MMHG

## 2022-02-14 VITALS — SYSTOLIC BLOOD PRESSURE: 88 MMHG

## 2022-02-14 VITALS — SYSTOLIC BLOOD PRESSURE: 139 MMHG

## 2022-02-14 VITALS — SYSTOLIC BLOOD PRESSURE: 140 MMHG

## 2022-02-14 VITALS — SYSTOLIC BLOOD PRESSURE: 102 MMHG

## 2022-02-14 LAB
ANION GAP SERPL CALCULATED.3IONS-SCNC: 11 MMOL/L (ref 5–15)
BASOPHILS # BLD AUTO: 0 K/UL (ref 0–0.2)
BASOPHILS NFR BLD AUTO: 0.2 % (ref 0–2)
BUN SERPL-MCNC: 26 MG/DL (ref 8–21)
CALCIUM SERPL-MCNC: 7.8 MG/DL (ref 8.4–11)
CHLORIDE SERPL-SCNC: 110 MMOL/L (ref 98–107)
CREAT SERPL-MCNC: 0.56 MG/DL (ref 0.55–1.3)
EOSINOPHIL # BLD AUTO: 0 K/UL (ref 0–0.4)
EOSINOPHIL NFR BLD AUTO: 0 % (ref 0–4)
ERYTHROCYTE [DISTWIDTH] IN BLOOD BY AUTOMATED COUNT: 20.8 % (ref 9–15)
GFR SERPL CREATININE-BSD FRML MDRD: (no result) ML/MIN (ref 90–?)
GLUCOSE SERPL-MCNC: 163 MG/DL (ref 70–99)
HCT VFR BLD AUTO: 28.3 % (ref 36–54)
HGB BLD-MCNC: 8.9 G/DL (ref 14–18)
LYMPHOCYTES # BLD AUTO: 1.2 K/UL (ref 1–5.5)
LYMPHOCYTES NFR BLD AUTO: 16.1 % (ref 20.5–51.5)
MCH RBC QN AUTO: 21 PG (ref 27–31)
MCHC RBC AUTO-ENTMCNC: 31 % (ref 32–36)
MCV RBC AUTO: 67 FL (ref 79–98)
MONOCYTES # BLD MANUAL: 0.3 K/UL (ref 0–1)
MONOCYTES # BLD MANUAL: 4.5 % (ref 1.7–9.3)
NEUTROPHILS # BLD AUTO: 5.9 K/UL (ref 1.8–7.7)
NEUTROPHILS NFR BLD AUTO: 79.2 % (ref 40–70)
PLATELET # BLD AUTO: 357 K/UL (ref 130–430)
POTASSIUM SERPL-SCNC: 4.3 MMOL/L (ref 3.5–5.1)
RBC # BLD AUTO: 4.22 MIL/UL (ref 4.2–6.2)
SODIUM SERPLBLD-SCNC: 143 MMOL/L (ref 136–145)
WBC # BLD AUTO: 7.4 K/UL (ref 4.8–10.8)

## 2022-02-14 RX ADMIN — ENOXAPARIN SODIUM SCH MG: 40 INJECTION SUBCUTANEOUS at 09:54

## 2022-02-14 RX ADMIN — ALBUMIN (HUMAN) SCH MLS/HR: 5 SOLUTION INTRAVENOUS at 21:24

## 2022-02-14 RX ADMIN — SODIUM CHLORIDE SCH MLS/HR: 9 INJECTION, SOLUTION INTRAVENOUS at 12:49

## 2022-02-14 RX ADMIN — ALBUMIN (HUMAN) SCH MLS/HR: 5 SOLUTION INTRAVENOUS at 09:49

## 2022-02-14 RX ADMIN — ALBUTEROL SULFATE SCH PUFFS: 90 AEROSOL, METERED RESPIRATORY (INHALATION) at 19:50

## 2022-02-14 RX ADMIN — PROPOFOL PRN MLS/HR: 10 INJECTION, EMULSION INTRAVENOUS at 00:09

## 2022-02-14 RX ADMIN — FUROSEMIDE SCH MG: 10 INJECTION, SOLUTION INTRAMUSCULAR; INTRAVENOUS at 09:52

## 2022-02-14 RX ADMIN — DEXTROSE MONOHYDRATE SCH MLS/HR: 50 INJECTION, SOLUTION INTRAVENOUS at 16:54

## 2022-02-14 RX ADMIN — PROPOFOL PRN MLS/HR: 10 INJECTION, EMULSION INTRAVENOUS at 09:47

## 2022-02-14 RX ADMIN — ALBUMIN (HUMAN) SCH MLS/HR: 5 SOLUTION INTRAVENOUS at 15:33

## 2022-02-14 RX ADMIN — DEXTROSE MONOHYDRATE SCH MLS/HR: 50 INJECTION, SOLUTION INTRAVENOUS at 00:00

## 2022-02-14 RX ADMIN — DEXTROSE MONOHYDRATE SCH MLS/HR: 50 INJECTION, SOLUTION INTRAVENOUS at 07:03

## 2022-02-14 RX ADMIN — DEXTROSE MONOHYDRATE SCH MLS/HR: 50 INJECTION, SOLUTION INTRAVENOUS at 11:53

## 2022-02-14 RX ADMIN — METHYLPREDNISOLONE SCH MG: 40 INJECTION, POWDER, LYOPHILIZED, FOR SOLUTION INTRAMUSCULAR; INTRAVENOUS at 21:24

## 2022-02-14 RX ADMIN — OSELTAMIVIR PHOSPHATE SCH MG: 75 CAPSULE ORAL at 21:24

## 2022-02-14 RX ADMIN — PROPOFOL PRN MLS/HR: 10 INJECTION, EMULSION INTRAVENOUS at 12:51

## 2022-02-14 RX ADMIN — ALBUTEROL SULFATE SCH PUFFS: 90 AEROSOL, METERED RESPIRATORY (INHALATION) at 07:04

## 2022-02-14 RX ADMIN — METHYLPREDNISOLONE SCH MG: 40 INJECTION, POWDER, LYOPHILIZED, FOR SOLUTION INTRAMUSCULAR; INTRAVENOUS at 15:33

## 2022-02-14 RX ADMIN — Medication SCH UNIT: at 09:53

## 2022-02-14 RX ADMIN — METHYLPREDNISOLONE SCH MG: 40 INJECTION, POWDER, LYOPHILIZED, FOR SOLUTION INTRAMUSCULAR; INTRAVENOUS at 07:02

## 2022-02-14 RX ADMIN — PROPOFOL PRN MLS/HR: 10 INJECTION, EMULSION INTRAVENOUS at 16:56

## 2022-02-14 RX ADMIN — SACUBITRIL AND VALSARTAN SCH TABLET: 24; 26 TABLET, FILM COATED ORAL at 09:00

## 2022-02-14 RX ADMIN — OXYCODONE HYDROCHLORIDE AND ACETAMINOPHEN SCH MG: 500 TABLET ORAL at 09:53

## 2022-02-14 RX ADMIN — SODIUM CHLORIDE SCH MG: 9 INJECTION, SOLUTION INTRAVENOUS at 09:52

## 2022-02-14 RX ADMIN — OSELTAMIVIR PHOSPHATE SCH MG: 75 CAPSULE ORAL at 09:53

## 2022-02-14 RX ADMIN — SACUBITRIL AND VALSARTAN SCH TABLET: 24; 26 TABLET, FILM COATED ORAL at 21:24

## 2022-02-14 NOTE — NUR
Dr Ordonez was here as at this time updates on pt's condition notified MD that pt's wife 
called for updates, MD said he will call family he asked for face sheet with family phone 
number provided by this RN

## 2022-02-14 NOTE — NUR
Assumed pt care bedside report received from Cheryl GAGNON met pt sedated on Propofol @45mcg, 
withdraws extremities to pain, facial grimacing but eyes closed, ETT to ventilator, AC/VC  
RATE 20 , FIO2 40% PEEP5, oral care done and suction thick tan secretions. Vital signs 
stable still on Levophed @0.25mcg/kg/min, restraints on for safety, lorenzana to gravity, OGT 
vitaL AF 1.2@50ml/hr tolerating well will continue to monitor and treat as per care plan.

## 2022-02-14 NOTE — NUR
Change of shift report to Bartolome GAGNON and updates at bedside, ongoing drips, Propofol, vitals 
signs stable no sign of distress and no changes in care plan and condition.

## 2022-02-14 NOTE — NUR
Pt's wife Jama called,  updates given by this RN on pt's condition vent settings asked 
if pt is doing better, unable to answer this question as this is the first time taking care 
of the pt. Encouraged to verbalize her concerns also she asked for the MD to call her for 
updates, will let MD know during rounds and also will let family know if there is any 
changes.

## 2022-02-14 NOTE — NUR
ABG report received from [facility] 7.39 PCO 32, PO2 83.7, HCO3 19.5  BE -5.1 will report to 
pulmonologist during rounds.

PT IS COVID POSITIVE. on vent settings 20/ / fio2 40%/peep5

## 2022-02-15 VITALS — SYSTOLIC BLOOD PRESSURE: 111 MMHG

## 2022-02-15 VITALS — SYSTOLIC BLOOD PRESSURE: 129 MMHG

## 2022-02-15 VITALS — SYSTOLIC BLOOD PRESSURE: 131 MMHG

## 2022-02-15 VITALS — SYSTOLIC BLOOD PRESSURE: 140 MMHG

## 2022-02-15 VITALS — SYSTOLIC BLOOD PRESSURE: 119 MMHG

## 2022-02-15 VITALS — SYSTOLIC BLOOD PRESSURE: 135 MMHG

## 2022-02-15 VITALS — SYSTOLIC BLOOD PRESSURE: 171 MMHG

## 2022-02-15 VITALS — SYSTOLIC BLOOD PRESSURE: 122 MMHG

## 2022-02-15 VITALS — SYSTOLIC BLOOD PRESSURE: 150 MMHG

## 2022-02-15 VITALS — SYSTOLIC BLOOD PRESSURE: 141 MMHG

## 2022-02-15 VITALS — SYSTOLIC BLOOD PRESSURE: 144 MMHG

## 2022-02-15 VITALS — SYSTOLIC BLOOD PRESSURE: 151 MMHG

## 2022-02-15 VITALS — SYSTOLIC BLOOD PRESSURE: 153 MMHG

## 2022-02-15 VITALS — SYSTOLIC BLOOD PRESSURE: 136 MMHG

## 2022-02-15 VITALS — SYSTOLIC BLOOD PRESSURE: 145 MMHG

## 2022-02-15 VITALS — SYSTOLIC BLOOD PRESSURE: 110 MMHG

## 2022-02-15 VITALS — SYSTOLIC BLOOD PRESSURE: 146 MMHG

## 2022-02-15 VITALS — SYSTOLIC BLOOD PRESSURE: 157 MMHG

## 2022-02-15 VITALS — SYSTOLIC BLOOD PRESSURE: 117 MMHG

## 2022-02-15 VITALS — SYSTOLIC BLOOD PRESSURE: 139 MMHG

## 2022-02-15 VITALS — SYSTOLIC BLOOD PRESSURE: 127 MMHG

## 2022-02-15 VITALS — SYSTOLIC BLOOD PRESSURE: 130 MMHG

## 2022-02-15 VITALS — SYSTOLIC BLOOD PRESSURE: 124 MMHG

## 2022-02-15 LAB
ALBUMIN SERPL BCP-MCNC: 2.2 G/DL (ref 3.4–4.8)
ALT SERPL W P-5'-P-CCNC: 20 U/L (ref 12–78)
ANION GAP SERPL CALCULATED.3IONS-SCNC: 11 MMOL/L (ref 5–15)
AST SERPL W P-5'-P-CCNC: 16 U/L (ref 10–37)
BASOPHILS # BLD AUTO: 0 K/UL (ref 0–0.2)
BASOPHILS NFR BLD AUTO: 0.4 % (ref 0–2)
BILIRUB SERPL-MCNC: 0.2 MG/DL (ref 0–1)
BUN SERPL-MCNC: 26 MG/DL (ref 8–21)
CALCIUM SERPL-MCNC: 8 MG/DL (ref 8.4–11)
CHLORIDE SERPL-SCNC: 111 MMOL/L (ref 98–107)
CREAT SERPL-MCNC: 0.5 MG/DL (ref 0.55–1.3)
EOSINOPHIL # BLD AUTO: 0 K/UL (ref 0–0.4)
EOSINOPHIL NFR BLD AUTO: 0 % (ref 0–4)
ERYTHROCYTE [DISTWIDTH] IN BLOOD BY AUTOMATED COUNT: 20.5 % (ref 9–15)
GFR SERPL CREATININE-BSD FRML MDRD: (no result) ML/MIN (ref 90–?)
GLUCOSE SERPL-MCNC: 218 MG/DL (ref 70–99)
HCT VFR BLD AUTO: 26.3 % (ref 36–54)
HGB BLD-MCNC: 8.3 G/DL (ref 14–18)
LYMPHOCYTES # BLD AUTO: 1.7 K/UL (ref 1–5.5)
LYMPHOCYTES NFR BLD AUTO: 20.8 % (ref 20.5–51.5)
MCH RBC QN AUTO: 21 PG (ref 27–31)
MCHC RBC AUTO-ENTMCNC: 32 % (ref 32–36)
MCV RBC AUTO: 67 FL (ref 79–98)
MONOCYTES # BLD MANUAL: 0.6 K/UL (ref 0–1)
MONOCYTES # BLD MANUAL: 6.8 % (ref 1.7–9.3)
NEUTROPHILS # BLD AUTO: 6 K/UL (ref 1.8–7.7)
NEUTROPHILS NFR BLD AUTO: 72 % (ref 40–70)
PHOSPHATE SERPL-MCNC: 2.1 MG/DL (ref 2.7–4.5)
PLATELET # BLD AUTO: 340 K/UL (ref 130–430)
POTASSIUM SERPL-SCNC: 3.7 MMOL/L (ref 3.5–5.1)
RBC # BLD AUTO: 3.91 MIL/UL (ref 4.2–6.2)
SODIUM SERPLBLD-SCNC: 145 MMOL/L (ref 136–145)
WBC # BLD AUTO: 8.4 K/UL (ref 4.8–10.8)

## 2022-02-15 RX ADMIN — SACUBITRIL AND VALSARTAN SCH TABLET: 24; 26 TABLET, FILM COATED ORAL at 21:55

## 2022-02-15 RX ADMIN — SODIUM CHLORIDE SCH MLS/HR: 9 INJECTION, SOLUTION INTRAVENOUS at 09:46

## 2022-02-15 RX ADMIN — ALBUTEROL SULFATE SCH PUFFS: 90 AEROSOL, METERED RESPIRATORY (INHALATION) at 19:42

## 2022-02-15 RX ADMIN — OSELTAMIVIR PHOSPHATE SCH MG: 75 CAPSULE ORAL at 09:42

## 2022-02-15 RX ADMIN — DEXTROSE MONOHYDRATE SCH MLS/HR: 50 INJECTION, SOLUTION INTRAVENOUS at 09:33

## 2022-02-15 RX ADMIN — METHYLPREDNISOLONE SCH MG: 40 INJECTION, POWDER, LYOPHILIZED, FOR SOLUTION INTRAMUSCULAR; INTRAVENOUS at 21:57

## 2022-02-15 RX ADMIN — METHYLPREDNISOLONE SCH MG: 40 INJECTION, POWDER, LYOPHILIZED, FOR SOLUTION INTRAMUSCULAR; INTRAVENOUS at 13:36

## 2022-02-15 RX ADMIN — ALBUTEROL SULFATE SCH PUFFS: 90 AEROSOL, METERED RESPIRATORY (INHALATION) at 08:22

## 2022-02-15 RX ADMIN — PROPOFOL PRN MLS/HR: 10 INJECTION, EMULSION INTRAVENOUS at 13:36

## 2022-02-15 RX ADMIN — ENOXAPARIN SODIUM SCH MG: 40 INJECTION SUBCUTANEOUS at 09:41

## 2022-02-15 RX ADMIN — SODIUM CHLORIDE SCH MG: 9 INJECTION, SOLUTION INTRAVENOUS at 09:41

## 2022-02-15 RX ADMIN — METHYLPREDNISOLONE SCH MG: 40 INJECTION, POWDER, LYOPHILIZED, FOR SOLUTION INTRAMUSCULAR; INTRAVENOUS at 09:33

## 2022-02-15 RX ADMIN — PROPOFOL PRN MLS/HR: 10 INJECTION, EMULSION INTRAVENOUS at 09:40

## 2022-02-15 RX ADMIN — DEXTROSE MONOHYDRATE SCH MLS/HR: 50 INJECTION, SOLUTION INTRAVENOUS at 17:49

## 2022-02-15 RX ADMIN — Medication SCH UNIT: at 09:42

## 2022-02-15 RX ADMIN — HYDROMORPHONE HYDROCHLORIDE PRN MG: 8 TABLET ORAL at 09:43

## 2022-02-15 RX ADMIN — OSELTAMIVIR PHOSPHATE SCH MG: 75 CAPSULE ORAL at 21:56

## 2022-02-15 RX ADMIN — FUROSEMIDE SCH MG: 10 INJECTION, SOLUTION INTRAMUSCULAR; INTRAVENOUS at 09:38

## 2022-02-15 RX ADMIN — ALBUTEROL SULFATE SCH PUFFS: 90 AEROSOL, METERED RESPIRATORY (INHALATION) at 00:18

## 2022-02-15 RX ADMIN — OXYCODONE HYDROCHLORIDE AND ACETAMINOPHEN SCH MG: 500 TABLET ORAL at 09:42

## 2022-02-15 RX ADMIN — DEXTROSE MONOHYDRATE SCH MLS/HR: 50 INJECTION, SOLUTION INTRAVENOUS at 00:22

## 2022-02-15 RX ADMIN — ALBUTEROL SULFATE SCH PUFFS: 90 AEROSOL, METERED RESPIRATORY (INHALATION) at 12:36

## 2022-02-15 RX ADMIN — SACUBITRIL AND VALSARTAN SCH TABLET: 24; 26 TABLET, FILM COATED ORAL at 09:29

## 2022-02-15 RX ADMIN — DEXTROSE MONOHYDRATE SCH MLS/HR: 50 INJECTION, SOLUTION INTRAVENOUS at 12:24

## 2022-02-15 NOTE — NUR
Pt's wife called updates on pt's condition and support as at this time pt still intubated 
but on SIMV mode awake able to follow command.

## 2022-02-15 NOTE — NUR
Pt vent settings switched to SIMV rate 16, fio2 40%, PS14 peep 6 and , Propofol 
decreased and ABG done reported to Dr LOWERY PH 7.430, PCO2 33.9, PO2 64.2, HCO3 22 and 
BE-1.8. Orders received to change rate to 14 and PS 12 done by Paula JARRELL.

## 2022-02-15 NOTE — NUR
Dr LOWERY rounds at the bedside, mentioned to MD that pt tolerated SIMV for about 3hours 
only, MD said to repeat same process  in AM tomorrow and PRECEDEX order received for pt's 
comfort/tolerance.

## 2022-02-15 NOTE — NUR
Opening Note

Received report from dayshift nurse. Patient is resting in bed. VSS. No signs of distress. 
Will monitor throughout night

## 2022-02-15 NOTE — NUR
Complete CHG bed bath and linen tolerated well large BM. Oral / lorenzana care and suctioned via 
ETT.

-------------------------------------------------------------------------------

Addendum: 02/15/22 at 1920 by [facility] Yessi Registry RN

-------------------------------------------------------------------------------

Skin checked intact no break down noted repositioning q2 hr.

## 2022-02-15 NOTE — NUR
Change of shift report updates on pt's condition ongoing treatments as at this time no 
changes on care plan. Tolerating well all care vitals signs stable and no sign of distress.

Emphasis that Dr Kapoor want to repeat SIMV in AM  and Precedex gtt for pt's tolerance

## 2022-02-15 NOTE — NUR
PT became restless,Ativan given not effective,  RR >40 very anxious O2 sat 85%, ventilator 
settings switched back to A/C mode and increased sedation for pt's comfort.

## 2022-02-15 NOTE — NUR
Assumed pt care report received from Bartolome GAGNON met alert still on Propfol drip @20mcg, 
follows command moves all extremities vitals signs stable afebrile, ETT to vent tolerating 
well, continuous support/ reorientation  at the bedside to alleviates anxiety, oral care, 
suction via ETT, restraints released skin assessed no breakdown noted, ROM to all 
extremities, ongoing hydration with tube feeding residual <10,Vital AF 1.2@40ml/hr, 
placement checked, verified by auscultation,  NS 0.9% @50ml/hr and pt repositioned for 
comfort.

## 2022-02-15 NOTE — NUR
Dr Garcia's rounds at the bedside, MD aware pt's occasional bradycardia no new order 
received blood pressure stable and WNL.

## 2022-02-16 VITALS — SYSTOLIC BLOOD PRESSURE: 171 MMHG

## 2022-02-16 VITALS — SYSTOLIC BLOOD PRESSURE: 157 MMHG

## 2022-02-16 VITALS — SYSTOLIC BLOOD PRESSURE: 156 MMHG

## 2022-02-16 VITALS — SYSTOLIC BLOOD PRESSURE: 149 MMHG

## 2022-02-16 VITALS — SYSTOLIC BLOOD PRESSURE: 170 MMHG

## 2022-02-16 VITALS — SYSTOLIC BLOOD PRESSURE: 195 MMHG

## 2022-02-16 VITALS — SYSTOLIC BLOOD PRESSURE: 209 MMHG

## 2022-02-16 VITALS — SYSTOLIC BLOOD PRESSURE: 148 MMHG

## 2022-02-16 VITALS — SYSTOLIC BLOOD PRESSURE: 176 MMHG

## 2022-02-16 VITALS — SYSTOLIC BLOOD PRESSURE: 155 MMHG

## 2022-02-16 VITALS — SYSTOLIC BLOOD PRESSURE: 159 MMHG

## 2022-02-16 VITALS — SYSTOLIC BLOOD PRESSURE: 181 MMHG

## 2022-02-16 VITALS — SYSTOLIC BLOOD PRESSURE: 150 MMHG

## 2022-02-16 VITALS — SYSTOLIC BLOOD PRESSURE: 147 MMHG

## 2022-02-16 VITALS — SYSTOLIC BLOOD PRESSURE: 177 MMHG

## 2022-02-16 VITALS — SYSTOLIC BLOOD PRESSURE: 162 MMHG

## 2022-02-16 VITALS — SYSTOLIC BLOOD PRESSURE: 165 MMHG

## 2022-02-16 VITALS — SYSTOLIC BLOOD PRESSURE: 178 MMHG

## 2022-02-16 VITALS — SYSTOLIC BLOOD PRESSURE: 183 MMHG

## 2022-02-16 VITALS — SYSTOLIC BLOOD PRESSURE: 152 MMHG

## 2022-02-16 VITALS — SYSTOLIC BLOOD PRESSURE: 145 MMHG

## 2022-02-16 VITALS — SYSTOLIC BLOOD PRESSURE: 186 MMHG

## 2022-02-16 VITALS — SYSTOLIC BLOOD PRESSURE: 151 MMHG

## 2022-02-16 VITALS — SYSTOLIC BLOOD PRESSURE: 158 MMHG

## 2022-02-16 VITALS — SYSTOLIC BLOOD PRESSURE: 144 MMHG

## 2022-02-16 VITALS — SYSTOLIC BLOOD PRESSURE: 163 MMHG

## 2022-02-16 VITALS — SYSTOLIC BLOOD PRESSURE: 173 MMHG

## 2022-02-16 VITALS — SYSTOLIC BLOOD PRESSURE: 185 MMHG

## 2022-02-16 VITALS — SYSTOLIC BLOOD PRESSURE: 127 MMHG

## 2022-02-16 VITALS — SYSTOLIC BLOOD PRESSURE: 172 MMHG

## 2022-02-16 LAB
ALBUMIN SERPL BCP-MCNC: 2.4 G/DL (ref 3.4–4.8)
ALT SERPL W P-5'-P-CCNC: 11 U/L (ref 12–78)
ANION GAP SERPL CALCULATED.3IONS-SCNC: 11 MMOL/L (ref 5–15)
AST SERPL W P-5'-P-CCNC: 15 U/L (ref 10–37)
BASOPHILS # BLD AUTO: 0.1 K/UL (ref 0–0.2)
BASOPHILS NFR BLD AUTO: 1 % (ref 0–2)
BILIRUB SERPL-MCNC: 0.3 MG/DL (ref 0–1)
BUN SERPL-MCNC: 26 MG/DL (ref 8–21)
CALCIUM SERPL-MCNC: 8.2 MG/DL (ref 8.4–11)
CHLORIDE SERPL-SCNC: 111 MMOL/L (ref 98–107)
CREAT SERPL-MCNC: 0.5 MG/DL (ref 0.55–1.3)
CRP SERPL-MCNC: 3.3 MG/DL (ref 0–0.5)
EOSINOPHIL # BLD AUTO: 0 K/UL (ref 0–0.4)
EOSINOPHIL NFR BLD AUTO: 0 % (ref 0–4)
ERYTHROCYTE [DISTWIDTH] IN BLOOD BY AUTOMATED COUNT: 21 % (ref 9–15)
GFR SERPL CREATININE-BSD FRML MDRD: (no result) ML/MIN (ref 90–?)
GLUCOSE SERPL-MCNC: 183 MG/DL (ref 70–99)
HCT VFR BLD AUTO: 30.6 % (ref 36–54)
HGB BLD-MCNC: 9.7 G/DL (ref 14–18)
LYMPHOCYTES # BLD AUTO: 2.7 K/UL (ref 1–5.5)
LYMPHOCYTES NFR BLD AUTO: 20 % (ref 20.5–51.5)
MCH RBC QN AUTO: 22 PG (ref 27–31)
MCHC RBC AUTO-ENTMCNC: 32 % (ref 32–36)
MCV RBC AUTO: 68 FL (ref 79–98)
MONOCYTES # BLD MANUAL: 1 K/UL (ref 0–1)
MONOCYTES # BLD MANUAL: 7.3 % (ref 1.7–9.3)
NEUTROPHILS # BLD AUTO: 9.6 K/UL (ref 1.8–7.7)
NEUTROPHILS NFR BLD AUTO: 71.7 % (ref 40–70)
PLATELET # BLD AUTO: 408 K/UL (ref 130–430)
POTASSIUM SERPL-SCNC: 3.2 MMOL/L (ref 3.5–5.1)
RBC # BLD AUTO: 4.51 MIL/UL (ref 4.2–6.2)
SODIUM SERPLBLD-SCNC: 147 MMOL/L (ref 136–145)
WBC # BLD AUTO: 13.4 K/UL (ref 4.8–10.8)

## 2022-02-16 RX ADMIN — SACUBITRIL AND VALSARTAN SCH TABLET: 24; 26 TABLET, FILM COATED ORAL at 23:51

## 2022-02-16 RX ADMIN — DEXTROSE MONOHYDRATE SCH MLS/HR: 50 INJECTION, SOLUTION INTRAVENOUS at 23:52

## 2022-02-16 RX ADMIN — ALBUTEROL SULFATE SCH PUFFS: 90 AEROSOL, METERED RESPIRATORY (INHALATION) at 13:05

## 2022-02-16 RX ADMIN — SACUBITRIL AND VALSARTAN SCH TABLET: 24; 26 TABLET, FILM COATED ORAL at 09:30

## 2022-02-16 RX ADMIN — ALBUTEROL SULFATE SCH PUFFS: 90 AEROSOL, METERED RESPIRATORY (INHALATION) at 07:28

## 2022-02-16 RX ADMIN — ALBUTEROL SULFATE SCH PUFFS: 90 AEROSOL, METERED RESPIRATORY (INHALATION) at 19:00

## 2022-02-16 RX ADMIN — PROPOFOL PRN MLS/HR: 10 INJECTION, EMULSION INTRAVENOUS at 10:39

## 2022-02-16 RX ADMIN — SODIUM CHLORIDE SCH MG: 9 INJECTION, SOLUTION INTRAVENOUS at 09:30

## 2022-02-16 RX ADMIN — METHYLPREDNISOLONE SCH MG: 40 INJECTION, POWDER, LYOPHILIZED, FOR SOLUTION INTRAMUSCULAR; INTRAVENOUS at 05:28

## 2022-02-16 RX ADMIN — INSULIN HUMAN PRN MLS/HR: 100 INJECTION, SOLUTION PARENTERAL at 07:52

## 2022-02-16 RX ADMIN — PROPOFOL PRN MLS/HR: 10 INJECTION, EMULSION INTRAVENOUS at 00:14

## 2022-02-16 RX ADMIN — PROPOFOL PRN MLS/HR: 10 INJECTION, EMULSION INTRAVENOUS at 13:26

## 2022-02-16 RX ADMIN — OXYCODONE HYDROCHLORIDE AND ACETAMINOPHEN SCH MG: 500 TABLET ORAL at 09:30

## 2022-02-16 RX ADMIN — METHYLPREDNISOLONE SCH MG: 40 INJECTION, POWDER, LYOPHILIZED, FOR SOLUTION INTRAMUSCULAR; INTRAVENOUS at 23:52

## 2022-02-16 RX ADMIN — METHYLPREDNISOLONE SCH MG: 40 INJECTION, POWDER, LYOPHILIZED, FOR SOLUTION INTRAMUSCULAR; INTRAVENOUS at 14:44

## 2022-02-16 RX ADMIN — Medication SCH UNIT: at 09:30

## 2022-02-16 RX ADMIN — OSELTAMIVIR PHOSPHATE SCH MG: 75 CAPSULE ORAL at 23:51

## 2022-02-16 RX ADMIN — OSELTAMIVIR PHOSPHATE SCH MG: 75 CAPSULE ORAL at 09:30

## 2022-02-16 RX ADMIN — DEXTROSE MONOHYDRATE SCH MLS/HR: 50 INJECTION, SOLUTION INTRAVENOUS at 00:14

## 2022-02-16 RX ADMIN — DEXTROSE MONOHYDRATE SCH MLS/HR: 50 INJECTION, SOLUTION INTRAVENOUS at 18:01

## 2022-02-16 RX ADMIN — FUROSEMIDE SCH MG: 10 INJECTION, SOLUTION INTRAMUSCULAR; INTRAVENOUS at 10:15

## 2022-02-16 RX ADMIN — DEXTROSE MONOHYDRATE SCH MLS/HR: 50 INJECTION, SOLUTION INTRAVENOUS at 12:24

## 2022-02-16 RX ADMIN — ALBUTEROL SULFATE SCH PUFFS: 90 AEROSOL, METERED RESPIRATORY (INHALATION) at 00:05

## 2022-02-16 RX ADMIN — ALBUTEROL SULFATE SCH PUFFS: 90 AEROSOL, METERED RESPIRATORY (INHALATION) at 23:24

## 2022-02-16 RX ADMIN — SODIUM CHLORIDE SCH MLS/HR: 9 INJECTION, SOLUTION INTRAVENOUS at 08:00

## 2022-02-16 RX ADMIN — DEXTROSE MONOHYDRATE SCH MLS/HR: 50 INJECTION, SOLUTION INTRAVENOUS at 05:27

## 2022-02-16 RX ADMIN — ENOXAPARIN SODIUM SCH MG: 40 INJECTION SUBCUTANEOUS at 09:30

## 2022-02-16 NOTE — NUR
Dietitian Recommendations 



*Increasing Vital AF 1.2 to 50 ml/hr; if bolus feedin ml Q6h (960 kcal/day), 

Free Water Flush: per physician d/t CHF via OGT  

Provides 1440 kcal/day, 90 gm protein/day, and 973 ml free water/day

Meets: 90% of estimated caloric needs and 93% of lower end of estimated 

nutritional needs

*Prosource 1 pkt BID ( provide additional 120 kcal, 30g protein)



Please refer to nutrition assessment for details.

## 2022-02-16 NOTE — NUR
Spoke with Dr. Garcia. Informed him that the patients heart rate is dropping into the low 
40s. Received orders for dobutamine at 5mcg/kg/minute. Will monitor the patient.

## 2022-02-16 NOTE — NUR
Spoke with Dr. Garcia and informed him that the patient's blood prssure was gradually 
increading. Recieved new orders from to lower the dobutamine to 1mcg/kg/minute.

## 2022-02-16 NOTE — NUR
RT NOTES

RN WAS NOTIFIED. FIO2 TO 0.50 DUE TO LOW SATURATION. PT BECOMES EASILY AGITATED EVEN WITH 
SLIGHTEST STIMULATION, MAY NEED MORE SEDATION.

## 2022-02-16 NOTE — NUR
0830 Reinserted orogastric tube. Will order chest x-ray to verify placement before using the 
access for medications.

## 2022-02-16 NOTE — NUR
ICU 1 [facility]



Nutrition F/U:



RD reviewed pt's current EMR record including diet Hx, physician notes, nursing notes, 
pertinent labs/meds/procedures, care trends, and care activity.



Admitting Diagnosis 

CHF, influenza, COVID

Medical History Comment: 

DM, PE in R arm, RA, and gout per physician notes



Pt also found w/ septic shock, acute covi 19 pneumonia, hypoension per physician notes



SARS-CoV-2 Ag (Rapid) Positive  & Influenza B Positive 



Subjective Information 

RD pt at bedside, TF is running, per primary RN, NGT was felled out of unknown time frame. 
RD reinserted through OGT and restarted the feeding. Rn also informed that possibly of 
providing additional protein intake due to low Alb level and incident of NGT felt out cause 
inadequate nutrient intake. 

Per EMR, Pt remains intubated and is more awake per MD note, respiratory failure slowly 
getting better, will start weaning trial w/ CPAP. Skin risk score of 15 noted, skin intact. 
Pt is tolerating TF with no residual noted. Current TF prescription is not meeting estimated 
needs but beneficial at current rate for weaning trials efficacy and consider adjusting rate 
in the next follow up to prevent weight change. 

 

Current Diet Order/Nutrition Support 

Vital AF 1.2 ar 40ml/hr (goal rate), FWF 240ml Q6hrs via OGT x 4 days



Patient/Significant Other 

Unable To Verbalize

Education Provided 

Not Indicated

Pertinent Medications 

Potassium phosphate, protonix, zinc, VIT D3, VIT C, lasix, lovenox, propofol at 1.92 ml/hr 
(51 kcal/day)

Pertinent Labs 

() WBC 13.4 H, Na 147 H, CL: 111 H, K: 3.2 L, BUN 26 H,  H, ALB: 2.4 L

Height (Feet) 

5 feet

Height (Inches) 

5.00 inches

Weight (Pounds) 

141 pounds

Weight (Calculated Kilograms) 

63.927857 kilograms

Patient Weight

63.957 kg- stable since 

Body Mass Index

23.46 kg/m2

%IBW 

104

Ideal/Adjusted Body Weight 

136#/62 kg

Recent Weight Change 

Unable to verify

Weight Status 

Appropriate

Food Allergies 

Unable to verify

Estimated Energy Expenditure (kcals/day) 

1583 (PSU  d/t critical illness, intubated; Ve: 11.7, Tmax: 36.9'C)

Estimated Protein Required (g/day) 

 (1.5-2 gm/kg CBW d/t sepsis)

Estimated Fluid Required (l/day) 

Per physician d/t CHF



Problem/Etiology/Signs/Symptoms 

Increased nutritional needs R/T metabolic demands AEB estimated 

nutritional requirements for sepsis (*on going).



Altered nutrition-related labs R/T endocrine dysfunction AEB elevated BG 

lab values (*on going). 



Expected Outcomes/Goals 

- Monitor provision of EN support w/ goal of pt meeting at least 75% of 

estimated nutritional needs, labs trending WNL, normal GI function, and 

skin integrity/wt maintenance, weaning status



Dietitian Recommendations 



*Increasing Vital AF 1.2 to 50 ml/hr; if bolus feedin ml Q6h (960 kcal/day), 

Free Water Flush: per physician d/t CHF via OGT  

Provides 1440 kcal/day, 90 gm protein/day, and 973 ml free water/day

Meets: 90% of estimated caloric needs and 93% of lower end of estimated 

nutritional needs

*Prosource 1 pkt BID via OGT ( provide additional 120 kcal, 30g protein)



Follow Up 

High Risk: F/U in 2-3days

## 2022-02-17 VITALS — SYSTOLIC BLOOD PRESSURE: 146 MMHG

## 2022-02-17 VITALS — SYSTOLIC BLOOD PRESSURE: 165 MMHG

## 2022-02-17 VITALS — SYSTOLIC BLOOD PRESSURE: 182 MMHG

## 2022-02-17 VITALS — SYSTOLIC BLOOD PRESSURE: 112 MMHG

## 2022-02-17 VITALS — SYSTOLIC BLOOD PRESSURE: 119 MMHG

## 2022-02-17 VITALS — SYSTOLIC BLOOD PRESSURE: 126 MMHG

## 2022-02-17 VITALS — SYSTOLIC BLOOD PRESSURE: 115 MMHG

## 2022-02-17 VITALS — SYSTOLIC BLOOD PRESSURE: 168 MMHG

## 2022-02-17 VITALS — SYSTOLIC BLOOD PRESSURE: 171 MMHG

## 2022-02-17 VITALS — SYSTOLIC BLOOD PRESSURE: 180 MMHG

## 2022-02-17 VITALS — SYSTOLIC BLOOD PRESSURE: 127 MMHG

## 2022-02-17 VITALS — SYSTOLIC BLOOD PRESSURE: 141 MMHG

## 2022-02-17 VITALS — SYSTOLIC BLOOD PRESSURE: 143 MMHG

## 2022-02-17 VITALS — SYSTOLIC BLOOD PRESSURE: 157 MMHG

## 2022-02-17 VITALS — SYSTOLIC BLOOD PRESSURE: 101 MMHG

## 2022-02-17 VITALS — SYSTOLIC BLOOD PRESSURE: 129 MMHG

## 2022-02-17 VITALS — SYSTOLIC BLOOD PRESSURE: 135 MMHG

## 2022-02-17 VITALS — SYSTOLIC BLOOD PRESSURE: 167 MMHG

## 2022-02-17 VITALS — SYSTOLIC BLOOD PRESSURE: 111 MMHG

## 2022-02-17 VITALS — SYSTOLIC BLOOD PRESSURE: 132 MMHG

## 2022-02-17 VITALS — SYSTOLIC BLOOD PRESSURE: 153 MMHG

## 2022-02-17 VITALS — SYSTOLIC BLOOD PRESSURE: 137 MMHG

## 2022-02-17 VITALS — SYSTOLIC BLOOD PRESSURE: 124 MMHG

## 2022-02-17 VITALS — SYSTOLIC BLOOD PRESSURE: 149 MMHG

## 2022-02-17 VITALS — SYSTOLIC BLOOD PRESSURE: 120 MMHG

## 2022-02-17 VITALS — SYSTOLIC BLOOD PRESSURE: 123 MMHG

## 2022-02-17 LAB
ALBUMIN SERPL BCP-MCNC: 2.3 G/DL (ref 3.4–4.8)
ALT SERPL W P-5'-P-CCNC: 19 U/L (ref 12–78)
ANION GAP SERPL CALCULATED.3IONS-SCNC: 8 MMOL/L (ref 5–15)
AST SERPL W P-5'-P-CCNC: 14 U/L (ref 10–37)
BASOPHILS NFR BLD MANUAL: 0 % (ref 0–2)
BILIRUB SERPL-MCNC: 0.3 MG/DL (ref 0–1)
BUN SERPL-MCNC: 24 MG/DL (ref 8–21)
CALCIUM SERPL-MCNC: 8.8 MG/DL (ref 8.4–11)
CHLORIDE SERPL-SCNC: 111 MMOL/L (ref 98–107)
CREAT SERPL-MCNC: 0.51 MG/DL (ref 0.55–1.3)
EOSINOPHIL NFR BLD MANUAL: 0 % (ref 0–7)
ERYTHROCYTE [DISTWIDTH] IN BLOOD BY AUTOMATED COUNT: 21.5 % (ref 9–15)
GFR SERPL CREATININE-BSD FRML MDRD: (no result) ML/MIN (ref 90–?)
GLUCOSE SERPL-MCNC: 241 MG/DL (ref 70–99)
HCT VFR BLD AUTO: 32.1 % (ref 36–54)
HGB BLD-MCNC: 10 G/DL (ref 14–18)
LYMPHOCYTES NFR BLD MANUAL: 16 % (ref 20–46)
MCH RBC QN AUTO: 21 PG (ref 27–31)
MCHC RBC AUTO-ENTMCNC: 31 % (ref 32–36)
MCV RBC AUTO: 69 FL (ref 79–98)
METAMYELOCYTES NFR BLD MANUAL: 1 % (ref 0–0)
MONOCYTES # BLD MANUAL: 7 % (ref 0–11)
NEUTS BAND NFR BLD MANUAL: 2 % (ref 0–6)
NRBC BLD MANUAL-RTO: 11 /100WBC (ref 0–0)
PHOSPHATE SERPL-MCNC: 3.3 MG/DL (ref 2.7–4.5)
PLATELET # BLD AUTO: 403 K/UL (ref 130–430)
POTASSIUM SERPL-SCNC: 4 MMOL/L (ref 3.5–5.1)
RBC # BLD AUTO: 4.68 MIL/UL (ref 4.2–6.2)
SODIUM SERPLBLD-SCNC: 146 MMOL/L (ref 136–145)
WBC # BLD AUTO: 13.2 K/UL (ref 4.8–10.8)
WBC NRBC COR # BLD AUTO: 11.9 K/UL (ref 4.5–11)

## 2022-02-17 RX ADMIN — SODIUM CHLORIDE SCH MG: 9 INJECTION, SOLUTION INTRAVENOUS at 09:08

## 2022-02-17 RX ADMIN — Medication SCH UNIT: at 09:09

## 2022-02-17 RX ADMIN — METHYLPREDNISOLONE SCH MG: 40 INJECTION, POWDER, LYOPHILIZED, FOR SOLUTION INTRAMUSCULAR; INTRAVENOUS at 06:17

## 2022-02-17 RX ADMIN — METHYLPREDNISOLONE SCH MG: 40 INJECTION, POWDER, LYOPHILIZED, FOR SOLUTION INTRAMUSCULAR; INTRAVENOUS at 14:09

## 2022-02-17 RX ADMIN — FUROSEMIDE SCH MG: 10 INJECTION, SOLUTION INTRAMUSCULAR; INTRAVENOUS at 09:08

## 2022-02-17 RX ADMIN — PROPOFOL PRN MLS/HR: 10 INJECTION, EMULSION INTRAVENOUS at 22:22

## 2022-02-17 RX ADMIN — ALBUTEROL SULFATE SCH PUFFS: 90 AEROSOL, METERED RESPIRATORY (INHALATION) at 11:37

## 2022-02-17 RX ADMIN — DEXTROSE MONOHYDRATE SCH MLS/HR: 50 INJECTION, SOLUTION INTRAVENOUS at 18:43

## 2022-02-17 RX ADMIN — INSULIN HUMAN PRN MLS/HR: 100 INJECTION, SOLUTION PARENTERAL at 16:14

## 2022-02-17 RX ADMIN — OSELTAMIVIR PHOSPHATE SCH MG: 75 CAPSULE ORAL at 09:09

## 2022-02-17 RX ADMIN — ALBUTEROL SULFATE SCH PUFFS: 90 AEROSOL, METERED RESPIRATORY (INHALATION) at 07:35

## 2022-02-17 RX ADMIN — OSELTAMIVIR PHOSPHATE SCH MG: 75 CAPSULE ORAL at 21:00

## 2022-02-17 RX ADMIN — PROPOFOL PRN MLS/HR: 10 INJECTION, EMULSION INTRAVENOUS at 12:52

## 2022-02-17 RX ADMIN — SACUBITRIL AND VALSARTAN SCH TABLET: 24; 26 TABLET, FILM COATED ORAL at 09:10

## 2022-02-17 RX ADMIN — ENOXAPARIN SODIUM SCH MG: 40 INJECTION SUBCUTANEOUS at 09:11

## 2022-02-17 RX ADMIN — SODIUM CHLORIDE SCH MLS/HR: 9 INJECTION, SOLUTION INTRAVENOUS at 16:15

## 2022-02-17 RX ADMIN — OXYCODONE HYDROCHLORIDE AND ACETAMINOPHEN SCH MG: 500 TABLET ORAL at 09:09

## 2022-02-17 RX ADMIN — PROPOFOL PRN MLS/HR: 10 INJECTION, EMULSION INTRAVENOUS at 02:23

## 2022-02-17 RX ADMIN — DEXTROSE MONOHYDRATE SCH MLS/HR: 50 INJECTION, SOLUTION INTRAVENOUS at 11:57

## 2022-02-17 RX ADMIN — DEXTROSE MONOHYDRATE SCH MLS/HR: 50 INJECTION, SOLUTION INTRAVENOUS at 06:17

## 2022-02-17 RX ADMIN — METHYLPREDNISOLONE SCH MG: 40 INJECTION, POWDER, LYOPHILIZED, FOR SOLUTION INTRAMUSCULAR; INTRAVENOUS at 21:00

## 2022-02-17 NOTE — NUR
RT NOTES

pt is tachypneic, appears anxious, low saturation. Dr Garcia at bedside, instructed rn to 
increase sedation.

vent to simv 12, FIO2 0.50.

## 2022-02-17 NOTE — NUR
Opening Notes

Received patient from Sesar, patient bradycardia and tachypneic, tubefeeding vital AF 1.2 
50 ml/h skin is intact around feeding tube and vent tube. Patient had a BM, changed patients 
sheets, assessed skin, warm, dry, and intact. Repositioned patient, Propofol running at 15 
mg/kg, Precedex 0.3 mg/kg, NS 50 ml/hr.

## 2022-02-17 NOTE — NUR
Closing Notes

Patient is clean and dry, Diprivan 30 mg, Precedex 0.5 mg,  mL/hr. Wakes up when 
talked to, coughs over the vent, drained 1200 ml of urine.

## 2022-02-18 VITALS — SYSTOLIC BLOOD PRESSURE: 115 MMHG

## 2022-02-18 VITALS — SYSTOLIC BLOOD PRESSURE: 126 MMHG

## 2022-02-18 VITALS — SYSTOLIC BLOOD PRESSURE: 124 MMHG

## 2022-02-18 VITALS — SYSTOLIC BLOOD PRESSURE: 135 MMHG

## 2022-02-18 VITALS — SYSTOLIC BLOOD PRESSURE: 121 MMHG

## 2022-02-18 VITALS — SYSTOLIC BLOOD PRESSURE: 122 MMHG

## 2022-02-18 VITALS — SYSTOLIC BLOOD PRESSURE: 95 MMHG

## 2022-02-18 VITALS — SYSTOLIC BLOOD PRESSURE: 139 MMHG

## 2022-02-18 VITALS — SYSTOLIC BLOOD PRESSURE: 123 MMHG

## 2022-02-18 VITALS — SYSTOLIC BLOOD PRESSURE: 151 MMHG

## 2022-02-18 VITALS — SYSTOLIC BLOOD PRESSURE: 127 MMHG

## 2022-02-18 VITALS — SYSTOLIC BLOOD PRESSURE: 119 MMHG

## 2022-02-18 VITALS — SYSTOLIC BLOOD PRESSURE: 136 MMHG

## 2022-02-18 VITALS — SYSTOLIC BLOOD PRESSURE: 163 MMHG

## 2022-02-18 VITALS — SYSTOLIC BLOOD PRESSURE: 133 MMHG

## 2022-02-18 VITALS — SYSTOLIC BLOOD PRESSURE: 161 MMHG

## 2022-02-18 VITALS — SYSTOLIC BLOOD PRESSURE: 165 MMHG

## 2022-02-18 VITALS — SYSTOLIC BLOOD PRESSURE: 171 MMHG

## 2022-02-18 VITALS — SYSTOLIC BLOOD PRESSURE: 149 MMHG

## 2022-02-18 VITALS — SYSTOLIC BLOOD PRESSURE: 158 MMHG

## 2022-02-18 VITALS — SYSTOLIC BLOOD PRESSURE: 138 MMHG

## 2022-02-18 VITALS — SYSTOLIC BLOOD PRESSURE: 117 MMHG

## 2022-02-18 VITALS — SYSTOLIC BLOOD PRESSURE: 137 MMHG

## 2022-02-18 VITALS — SYSTOLIC BLOOD PRESSURE: 157 MMHG

## 2022-02-18 VITALS — SYSTOLIC BLOOD PRESSURE: 116 MMHG

## 2022-02-18 LAB
ANION GAP SERPL CALCULATED.3IONS-SCNC: 9 MMOL/L (ref 5–15)
BASOPHILS # BLD AUTO: 0 K/UL (ref 0–0.2)
BASOPHILS NFR BLD AUTO: 0.4 % (ref 0–2)
BUN SERPL-MCNC: 27 MG/DL (ref 8–21)
CALCIUM SERPL-MCNC: 8.3 MG/DL (ref 8.4–11)
CHLORIDE SERPL-SCNC: 113 MMOL/L (ref 98–107)
CREAT SERPL-MCNC: 0.44 MG/DL (ref 0.55–1.3)
EOSINOPHIL # BLD AUTO: 0 K/UL (ref 0–0.4)
EOSINOPHIL NFR BLD AUTO: 0.1 % (ref 0–4)
ERYTHROCYTE [DISTWIDTH] IN BLOOD BY AUTOMATED COUNT: 21.7 % (ref 9–15)
GFR SERPL CREATININE-BSD FRML MDRD: (no result) ML/MIN (ref 90–?)
GLUCOSE SERPL-MCNC: 240 MG/DL (ref 70–99)
HCT VFR BLD AUTO: 31.1 % (ref 36–54)
HGB BLD-MCNC: 9.5 G/DL (ref 14–18)
LYMPHOCYTES # BLD AUTO: 1.7 K/UL (ref 1–5.5)
LYMPHOCYTES NFR BLD AUTO: 15.1 % (ref 20.5–51.5)
MCH RBC QN AUTO: 21 PG (ref 27–31)
MCHC RBC AUTO-ENTMCNC: 31 % (ref 32–36)
MCV RBC AUTO: 69 FL (ref 79–98)
MONOCYTES # BLD MANUAL: 0.5 K/UL (ref 0–1)
MONOCYTES # BLD MANUAL: 4.7 % (ref 1.7–9.3)
NEUTROPHILS # BLD AUTO: 9 K/UL (ref 1.8–7.7)
NEUTROPHILS NFR BLD AUTO: 79.7 % (ref 40–70)
PLATELET # BLD AUTO: 418 K/UL (ref 130–430)
POTASSIUM SERPL-SCNC: 3.4 MMOL/L (ref 3.5–5.1)
RBC # BLD AUTO: 4.49 MIL/UL (ref 4.2–6.2)
SODIUM SERPLBLD-SCNC: 148 MMOL/L (ref 136–145)
WBC # BLD AUTO: 11.3 K/UL (ref 4.8–10.8)

## 2022-02-18 RX ADMIN — INSULIN HUMAN PRN MLS/HR: 100 INJECTION, SOLUTION PARENTERAL at 14:05

## 2022-02-18 RX ADMIN — INSULIN HUMAN PRN MLS/HR: 100 INJECTION, SOLUTION PARENTERAL at 22:04

## 2022-02-18 RX ADMIN — METHYLPREDNISOLONE SCH MG: 40 INJECTION, POWDER, LYOPHILIZED, FOR SOLUTION INTRAMUSCULAR; INTRAVENOUS at 06:50

## 2022-02-18 RX ADMIN — ALBUTEROL SULFATE SCH PUFFS: 90 AEROSOL, METERED RESPIRATORY (INHALATION) at 12:00

## 2022-02-18 RX ADMIN — OSELTAMIVIR PHOSPHATE SCH MG: 75 CAPSULE ORAL at 09:31

## 2022-02-18 RX ADMIN — FUROSEMIDE SCH MG: 10 INJECTION, SOLUTION INTRAMUSCULAR; INTRAVENOUS at 09:31

## 2022-02-18 RX ADMIN — SODIUM CHLORIDE SCH MLS/HR: 9 INJECTION, SOLUTION INTRAVENOUS at 15:08

## 2022-02-18 RX ADMIN — OSELTAMIVIR PHOSPHATE SCH MG: 75 CAPSULE ORAL at 21:54

## 2022-02-18 RX ADMIN — SODIUM CHLORIDE SCH MLS/HR: 9 INJECTION, SOLUTION INTRAVENOUS at 06:49

## 2022-02-18 RX ADMIN — ALBUTEROL SULFATE SCH PUFFS: 90 AEROSOL, METERED RESPIRATORY (INHALATION) at 06:00

## 2022-02-18 RX ADMIN — SACUBITRIL AND VALSARTAN SCH TABLET: 24; 26 TABLET, FILM COATED ORAL at 00:40

## 2022-02-18 RX ADMIN — PROPOFOL PRN MLS/HR: 10 INJECTION, EMULSION INTRAVENOUS at 15:08

## 2022-02-18 RX ADMIN — DEXTROSE MONOHYDRATE SCH MLS/HR: 50 INJECTION, SOLUTION INTRAVENOUS at 06:45

## 2022-02-18 RX ADMIN — PROPOFOL PRN MLS/HR: 10 INJECTION, EMULSION INTRAVENOUS at 03:22

## 2022-02-18 RX ADMIN — METHYLPREDNISOLONE SCH MG: 40 INJECTION, POWDER, LYOPHILIZED, FOR SOLUTION INTRAMUSCULAR; INTRAVENOUS at 13:58

## 2022-02-18 RX ADMIN — ALBUTEROL SULFATE SCH PUFFS: 90 AEROSOL, METERED RESPIRATORY (INHALATION) at 18:00

## 2022-02-18 RX ADMIN — OXYCODONE HYDROCHLORIDE AND ACETAMINOPHEN SCH MG: 500 TABLET ORAL at 09:32

## 2022-02-18 RX ADMIN — Medication SCH UNIT: at 09:32

## 2022-02-18 RX ADMIN — METHYLPREDNISOLONE SCH MG: 40 INJECTION, POWDER, LYOPHILIZED, FOR SOLUTION INTRAMUSCULAR; INTRAVENOUS at 21:55

## 2022-02-18 RX ADMIN — SODIUM CHLORIDE SCH MG: 9 INJECTION, SOLUTION INTRAVENOUS at 09:31

## 2022-02-18 RX ADMIN — DEXTROSE MONOHYDRATE SCH MLS/HR: 50 INJECTION, SOLUTION INTRAVENOUS at 00:40

## 2022-02-18 RX ADMIN — SACUBITRIL AND VALSARTAN SCH TABLET: 24; 26 TABLET, FILM COATED ORAL at 21:54

## 2022-02-18 RX ADMIN — DEXTROSE MONOHYDRATE SCH MLS/HR: 50 INJECTION, SOLUTION INTRAVENOUS at 21:53

## 2022-02-18 RX ADMIN — SACUBITRIL AND VALSARTAN SCH TABLET: 24; 26 TABLET, FILM COATED ORAL at 09:55

## 2022-02-18 RX ADMIN — DEXTROSE MONOHYDRATE SCH MLS/HR: 50 INJECTION, SOLUTION INTRAVENOUS at 12:53

## 2022-02-18 RX ADMIN — ENOXAPARIN SODIUM SCH MG: 40 INJECTION SUBCUTANEOUS at 09:35

## 2022-02-18 NOTE — NUR
rt notes

1114 Titrated fio2 to 35%, pt saturating 95%.

1200 Placed back to 40% fio2, pt desaturating to 86% when RN decreased profopol to minimum 
and just precedex on. will try titration again later. will continue to monitor pt.

## 2022-02-19 VITALS — SYSTOLIC BLOOD PRESSURE: 121 MMHG

## 2022-02-19 VITALS — SYSTOLIC BLOOD PRESSURE: 149 MMHG

## 2022-02-19 VITALS — SYSTOLIC BLOOD PRESSURE: 125 MMHG

## 2022-02-19 VITALS — SYSTOLIC BLOOD PRESSURE: 118 MMHG

## 2022-02-19 VITALS — SYSTOLIC BLOOD PRESSURE: 135 MMHG

## 2022-02-19 VITALS — SYSTOLIC BLOOD PRESSURE: 156 MMHG

## 2022-02-19 VITALS — SYSTOLIC BLOOD PRESSURE: 162 MMHG

## 2022-02-19 VITALS — SYSTOLIC BLOOD PRESSURE: 132 MMHG

## 2022-02-19 VITALS — SYSTOLIC BLOOD PRESSURE: 157 MMHG

## 2022-02-19 VITALS — SYSTOLIC BLOOD PRESSURE: 154 MMHG

## 2022-02-19 VITALS — SYSTOLIC BLOOD PRESSURE: 120 MMHG

## 2022-02-19 VITALS — SYSTOLIC BLOOD PRESSURE: 133 MMHG

## 2022-02-19 VITALS — SYSTOLIC BLOOD PRESSURE: 151 MMHG

## 2022-02-19 VITALS — SYSTOLIC BLOOD PRESSURE: 141 MMHG

## 2022-02-19 VITALS — SYSTOLIC BLOOD PRESSURE: 116 MMHG

## 2022-02-19 VITALS — SYSTOLIC BLOOD PRESSURE: 176 MMHG

## 2022-02-19 VITALS — SYSTOLIC BLOOD PRESSURE: 138 MMHG

## 2022-02-19 VITALS — SYSTOLIC BLOOD PRESSURE: 134 MMHG

## 2022-02-19 VITALS — SYSTOLIC BLOOD PRESSURE: 160 MMHG

## 2022-02-19 VITALS — SYSTOLIC BLOOD PRESSURE: 146 MMHG

## 2022-02-19 VITALS — SYSTOLIC BLOOD PRESSURE: 168 MMHG

## 2022-02-19 VITALS — SYSTOLIC BLOOD PRESSURE: 126 MMHG

## 2022-02-19 VITALS — SYSTOLIC BLOOD PRESSURE: 177 MMHG

## 2022-02-19 LAB
ANION GAP SERPL CALCULATED.3IONS-SCNC: 7 MMOL/L (ref 5–15)
BASOPHILS # BLD AUTO: 0.1 K/UL (ref 0–0.2)
BASOPHILS NFR BLD AUTO: 0.6 % (ref 0–2)
BUN SERPL-MCNC: 27 MG/DL (ref 8–21)
CALCIUM SERPL-MCNC: 8.2 MG/DL (ref 8.4–11)
CHLORIDE SERPL-SCNC: 114 MMOL/L (ref 98–107)
CREAT SERPL-MCNC: 0.42 MG/DL (ref 0.55–1.3)
EOSINOPHIL # BLD AUTO: 0.1 K/UL (ref 0–0.4)
EOSINOPHIL NFR BLD AUTO: 1.1 % (ref 0–4)
ERYTHROCYTE [DISTWIDTH] IN BLOOD BY AUTOMATED COUNT: 21.9 % (ref 9–15)
GFR SERPL CREATININE-BSD FRML MDRD: (no result) ML/MIN (ref 90–?)
GLUCOSE SERPL-MCNC: 266 MG/DL (ref 70–99)
HCT VFR BLD AUTO: 33.4 % (ref 36–54)
HGB BLD-MCNC: 10.2 G/DL (ref 14–18)
LYMPHOCYTES # BLD AUTO: 2.1 K/UL (ref 1–5.5)
LYMPHOCYTES NFR BLD AUTO: 19.8 % (ref 20.5–51.5)
MCH RBC QN AUTO: 21 PG (ref 27–31)
MCHC RBC AUTO-ENTMCNC: 31 % (ref 32–36)
MCV RBC AUTO: 70 FL (ref 79–98)
MONOCYTES # BLD MANUAL: 0.8 K/UL (ref 0–1)
MONOCYTES # BLD MANUAL: 7.6 % (ref 1.7–9.3)
NEUTROPHILS # BLD AUTO: 7.5 K/UL (ref 1.8–7.7)
NEUTROPHILS NFR BLD AUTO: 70.9 % (ref 40–70)
PLATELET # BLD AUTO: 413 K/UL (ref 130–430)
POTASSIUM SERPL-SCNC: 3 MMOL/L (ref 3.5–5.1)
RBC # BLD AUTO: 4.79 MIL/UL (ref 4.2–6.2)
SODIUM SERPLBLD-SCNC: 149 MMOL/L (ref 136–145)
WBC # BLD AUTO: 10.6 K/UL (ref 4.8–10.8)

## 2022-02-19 RX ADMIN — ALBUTEROL SULFATE SCH PUFFS: 90 AEROSOL, METERED RESPIRATORY (INHALATION) at 07:45

## 2022-02-19 RX ADMIN — ALBUTEROL SULFATE SCH PUFFS: 90 AEROSOL, METERED RESPIRATORY (INHALATION) at 18:00

## 2022-02-19 RX ADMIN — DEXTROSE MONOHYDRATE SCH MLS/HR: 50 INJECTION, SOLUTION INTRAVENOUS at 07:39

## 2022-02-19 RX ADMIN — ENOXAPARIN SODIUM SCH MG: 40 INJECTION SUBCUTANEOUS at 09:24

## 2022-02-19 RX ADMIN — INSULIN HUMAN PRN MLS/HR: 100 INJECTION, SOLUTION PARENTERAL at 06:22

## 2022-02-19 RX ADMIN — METHYLPREDNISOLONE SCH MG: 40 INJECTION, POWDER, LYOPHILIZED, FOR SOLUTION INTRAMUSCULAR; INTRAVENOUS at 21:35

## 2022-02-19 RX ADMIN — FUROSEMIDE SCH MG: 10 INJECTION, SOLUTION INTRAMUSCULAR; INTRAVENOUS at 09:17

## 2022-02-19 RX ADMIN — DEXTROSE MONOHYDRATE SCH MLS/HR: 50 INJECTION, SOLUTION INTRAVENOUS at 12:08

## 2022-02-19 RX ADMIN — SODIUM CHLORIDE SCH MLS/HR: 9 INJECTION, SOLUTION INTRAVENOUS at 12:21

## 2022-02-19 RX ADMIN — SODIUM CHLORIDE SCH MG: 9 INJECTION, SOLUTION INTRAVENOUS at 09:17

## 2022-02-19 RX ADMIN — METHYLPREDNISOLONE SCH MG: 40 INJECTION, POWDER, LYOPHILIZED, FOR SOLUTION INTRAMUSCULAR; INTRAVENOUS at 06:21

## 2022-02-19 RX ADMIN — ALBUTEROL SULFATE SCH PUFFS: 90 AEROSOL, METERED RESPIRATORY (INHALATION) at 11:56

## 2022-02-19 RX ADMIN — DEXTROSE MONOHYDRATE SCH MLS/HR: 50 INJECTION, SOLUTION INTRAVENOUS at 20:15

## 2022-02-19 RX ADMIN — Medication SCH UNIT: at 09:17

## 2022-02-19 RX ADMIN — SACUBITRIL AND VALSARTAN SCH TABLET: 24; 26 TABLET, FILM COATED ORAL at 21:35

## 2022-02-19 RX ADMIN — INSULIN HUMAN PRN MLS/HR: 100 INJECTION, SOLUTION PARENTERAL at 12:26

## 2022-02-19 RX ADMIN — DEXTROSE MONOHYDRATE SCH MLS/HR: 50 INJECTION, SOLUTION INTRAVENOUS at 07:38

## 2022-02-19 RX ADMIN — PROPOFOL PRN MLS/HR: 10 INJECTION, EMULSION INTRAVENOUS at 06:20

## 2022-02-19 RX ADMIN — ALBUTEROL SULFATE SCH PUFFS: 90 AEROSOL, METERED RESPIRATORY (INHALATION) at 00:00

## 2022-02-19 RX ADMIN — HYDROMORPHONE HYDROCHLORIDE PRN MG: 8 TABLET ORAL at 14:10

## 2022-02-19 RX ADMIN — METHYLPREDNISOLONE SCH MG: 40 INJECTION, POWDER, LYOPHILIZED, FOR SOLUTION INTRAMUSCULAR; INTRAVENOUS at 14:20

## 2022-02-19 RX ADMIN — OXYCODONE HYDROCHLORIDE AND ACETAMINOPHEN SCH MG: 500 TABLET ORAL at 09:18

## 2022-02-19 RX ADMIN — SACUBITRIL AND VALSARTAN SCH TABLET: 24; 26 TABLET, FILM COATED ORAL at 09:18

## 2022-02-19 NOTE — NUR
RT NOTES

Vent to simv 8 ps 12 per Dr healy's order. will monitor pt. Rn at bedside, made aware of 
new order.

## 2022-02-19 NOTE — NUR
RT NOTES

Vent back to previous settings SIMV 12 450 PS 10 +5 40% per CPAP for 1 hour. Pt. appears 
fatigued, paradoxical breathing noted. RN notified.

## 2022-02-20 VITALS — SYSTOLIC BLOOD PRESSURE: 152 MMHG

## 2022-02-20 VITALS — SYSTOLIC BLOOD PRESSURE: 138 MMHG

## 2022-02-20 VITALS — SYSTOLIC BLOOD PRESSURE: 128 MMHG

## 2022-02-20 VITALS — SYSTOLIC BLOOD PRESSURE: 162 MMHG

## 2022-02-20 VITALS — SYSTOLIC BLOOD PRESSURE: 179 MMHG

## 2022-02-20 VITALS — SYSTOLIC BLOOD PRESSURE: 164 MMHG

## 2022-02-20 VITALS — SYSTOLIC BLOOD PRESSURE: 171 MMHG

## 2022-02-20 VITALS — SYSTOLIC BLOOD PRESSURE: 145 MMHG

## 2022-02-20 VITALS — SYSTOLIC BLOOD PRESSURE: 147 MMHG

## 2022-02-20 VITALS — SYSTOLIC BLOOD PRESSURE: 137 MMHG

## 2022-02-20 VITALS — SYSTOLIC BLOOD PRESSURE: 130 MMHG

## 2022-02-20 VITALS — SYSTOLIC BLOOD PRESSURE: 140 MMHG

## 2022-02-20 VITALS — SYSTOLIC BLOOD PRESSURE: 167 MMHG

## 2022-02-20 VITALS — SYSTOLIC BLOOD PRESSURE: 116 MMHG

## 2022-02-20 VITALS — SYSTOLIC BLOOD PRESSURE: 143 MMHG

## 2022-02-20 VITALS — SYSTOLIC BLOOD PRESSURE: 122 MMHG

## 2022-02-20 VITALS — SYSTOLIC BLOOD PRESSURE: 150 MMHG

## 2022-02-20 VITALS — SYSTOLIC BLOOD PRESSURE: 125 MMHG

## 2022-02-20 VITALS — SYSTOLIC BLOOD PRESSURE: 154 MMHG

## 2022-02-20 VITALS — SYSTOLIC BLOOD PRESSURE: 155 MMHG

## 2022-02-20 VITALS — SYSTOLIC BLOOD PRESSURE: 166 MMHG

## 2022-02-20 VITALS — SYSTOLIC BLOOD PRESSURE: 175 MMHG

## 2022-02-20 VITALS — SYSTOLIC BLOOD PRESSURE: 177 MMHG

## 2022-02-20 VITALS — SYSTOLIC BLOOD PRESSURE: 124 MMHG

## 2022-02-20 VITALS — SYSTOLIC BLOOD PRESSURE: 153 MMHG

## 2022-02-20 LAB
ANION GAP SERPL CALCULATED.3IONS-SCNC: 7 MMOL/L (ref 5–15)
BASOPHILS # BLD AUTO: 0 K/UL (ref 0–0.2)
BASOPHILS NFR BLD AUTO: 0.4 % (ref 0–2)
BUN SERPL-MCNC: 30 MG/DL (ref 8–21)
CALCIUM SERPL-MCNC: 8.4 MG/DL (ref 8.4–11)
CHLORIDE SERPL-SCNC: 117 MMOL/L (ref 98–107)
CREAT SERPL-MCNC: 0.41 MG/DL (ref 0.55–1.3)
EOSINOPHIL # BLD AUTO: 0 K/UL (ref 0–0.4)
EOSINOPHIL NFR BLD AUTO: 0 % (ref 0–4)
ERYTHROCYTE [DISTWIDTH] IN BLOOD BY AUTOMATED COUNT: 22.7 % (ref 9–15)
GFR SERPL CREATININE-BSD FRML MDRD: (no result) ML/MIN (ref 90–?)
GLUCOSE SERPL-MCNC: 319 MG/DL (ref 70–99)
HCT VFR BLD AUTO: 34.8 % (ref 36–54)
HGB BLD-MCNC: 10.4 G/DL (ref 14–18)
LYMPHOCYTES # BLD AUTO: 1.3 K/UL (ref 1–5.5)
LYMPHOCYTES NFR BLD AUTO: 11.5 % (ref 20.5–51.5)
MCH RBC QN AUTO: 21 PG (ref 27–31)
MCHC RBC AUTO-ENTMCNC: 30 % (ref 32–36)
MCV RBC AUTO: 70 FL (ref 79–98)
MONOCYTES # BLD MANUAL: 0.3 K/UL (ref 0–1)
MONOCYTES # BLD MANUAL: 2.3 % (ref 1.7–9.3)
NEUTROPHILS # BLD AUTO: 9.8 K/UL (ref 1.8–7.7)
NEUTROPHILS NFR BLD AUTO: 85.8 % (ref 40–70)
PLATELET # BLD AUTO: 376 K/UL (ref 130–430)
POTASSIUM SERPL-SCNC: 3.4 MMOL/L (ref 3.5–5.1)
RBC # BLD AUTO: 4.97 MIL/UL (ref 4.2–6.2)
SODIUM SERPLBLD-SCNC: 150 MMOL/L (ref 136–145)
WBC # BLD AUTO: 11.4 K/UL (ref 4.8–10.8)

## 2022-02-20 RX ADMIN — METHYLPREDNISOLONE SCH MG: 40 INJECTION, POWDER, LYOPHILIZED, FOR SOLUTION INTRAMUSCULAR; INTRAVENOUS at 06:44

## 2022-02-20 RX ADMIN — ALBUTEROL SULFATE SCH PUFFS: 90 AEROSOL, METERED RESPIRATORY (INHALATION) at 13:25

## 2022-02-20 RX ADMIN — ALBUTEROL SULFATE SCH PUFFS: 90 AEROSOL, METERED RESPIRATORY (INHALATION) at 00:00

## 2022-02-20 RX ADMIN — DEXTROSE MONOHYDRATE SCH MLS/HR: 50 INJECTION, SOLUTION INTRAVENOUS at 18:00

## 2022-02-20 RX ADMIN — SACUBITRIL AND VALSARTAN SCH TABLET: 24; 26 TABLET, FILM COATED ORAL at 21:39

## 2022-02-20 RX ADMIN — OXYCODONE HYDROCHLORIDE AND ACETAMINOPHEN SCH MG: 500 TABLET ORAL at 10:37

## 2022-02-20 RX ADMIN — SACUBITRIL AND VALSARTAN SCH TABLET: 24; 26 TABLET, FILM COATED ORAL at 12:12

## 2022-02-20 RX ADMIN — DEXTROSE MONOHYDRATE SCH MLS/HR: 50 INJECTION, SOLUTION INTRAVENOUS at 06:43

## 2022-02-20 RX ADMIN — METHYLPREDNISOLONE SCH MG: 40 INJECTION, POWDER, LYOPHILIZED, FOR SOLUTION INTRAMUSCULAR; INTRAVENOUS at 21:40

## 2022-02-20 RX ADMIN — METHYLPREDNISOLONE SCH MG: 40 INJECTION, POWDER, LYOPHILIZED, FOR SOLUTION INTRAMUSCULAR; INTRAVENOUS at 15:23

## 2022-02-20 RX ADMIN — INSULIN HUMAN PRN MLS/HR: 100 INJECTION, SOLUTION PARENTERAL at 17:00

## 2022-02-20 RX ADMIN — DEXTROSE MONOHYDRATE SCH MLS/HR: 50 INJECTION, SOLUTION INTRAVENOUS at 13:36

## 2022-02-20 RX ADMIN — ENOXAPARIN SODIUM SCH MG: 40 INJECTION SUBCUTANEOUS at 10:54

## 2022-02-20 RX ADMIN — SODIUM CHLORIDE SCH MG: 9 INJECTION, SOLUTION INTRAVENOUS at 10:37

## 2022-02-20 RX ADMIN — DEXTROSE MONOHYDRATE SCH MLS/HR: 50 INJECTION, SOLUTION INTRAVENOUS at 01:00

## 2022-02-20 RX ADMIN — FUROSEMIDE SCH MG: 10 INJECTION, SOLUTION INTRAMUSCULAR; INTRAVENOUS at 10:37

## 2022-02-20 RX ADMIN — INSULIN HUMAN PRN MLS/HR: 100 INJECTION, SOLUTION PARENTERAL at 12:58

## 2022-02-20 RX ADMIN — Medication SCH UNIT: at 10:37

## 2022-02-20 RX ADMIN — ALBUTEROL SULFATE SCH PUFFS: 90 AEROSOL, METERED RESPIRATORY (INHALATION) at 06:00

## 2022-02-20 RX ADMIN — HYDROMORPHONE HYDROCHLORIDE PRN MG: 8 TABLET ORAL at 01:16

## 2022-02-21 VITALS — SYSTOLIC BLOOD PRESSURE: 116 MMHG

## 2022-02-21 VITALS — SYSTOLIC BLOOD PRESSURE: 131 MMHG

## 2022-02-21 VITALS — SYSTOLIC BLOOD PRESSURE: 101 MMHG

## 2022-02-21 VITALS — SYSTOLIC BLOOD PRESSURE: 147 MMHG

## 2022-02-21 VITALS — SYSTOLIC BLOOD PRESSURE: 133 MMHG

## 2022-02-21 VITALS — SYSTOLIC BLOOD PRESSURE: 210 MMHG

## 2022-02-21 VITALS — SYSTOLIC BLOOD PRESSURE: 145 MMHG

## 2022-02-21 VITALS — SYSTOLIC BLOOD PRESSURE: 121 MMHG

## 2022-02-21 VITALS — SYSTOLIC BLOOD PRESSURE: 159 MMHG

## 2022-02-21 VITALS — SYSTOLIC BLOOD PRESSURE: 155 MMHG

## 2022-02-21 VITALS — SYSTOLIC BLOOD PRESSURE: 152 MMHG

## 2022-02-21 VITALS — SYSTOLIC BLOOD PRESSURE: 169 MMHG

## 2022-02-21 VITALS — SYSTOLIC BLOOD PRESSURE: 115 MMHG

## 2022-02-21 VITALS — SYSTOLIC BLOOD PRESSURE: 113 MMHG

## 2022-02-21 VITALS — SYSTOLIC BLOOD PRESSURE: 143 MMHG

## 2022-02-21 VITALS — SYSTOLIC BLOOD PRESSURE: 139 MMHG

## 2022-02-21 VITALS — SYSTOLIC BLOOD PRESSURE: 142 MMHG

## 2022-02-21 VITALS — SYSTOLIC BLOOD PRESSURE: 137 MMHG

## 2022-02-21 VITALS — SYSTOLIC BLOOD PRESSURE: 135 MMHG

## 2022-02-21 VITALS — SYSTOLIC BLOOD PRESSURE: 146 MMHG

## 2022-02-21 VITALS — SYSTOLIC BLOOD PRESSURE: 127 MMHG

## 2022-02-21 VITALS — SYSTOLIC BLOOD PRESSURE: 122 MMHG

## 2022-02-21 VITALS — SYSTOLIC BLOOD PRESSURE: 158 MMHG

## 2022-02-21 VITALS — SYSTOLIC BLOOD PRESSURE: 168 MMHG

## 2022-02-21 VITALS — SYSTOLIC BLOOD PRESSURE: 108 MMHG

## 2022-02-21 VITALS — SYSTOLIC BLOOD PRESSURE: 149 MMHG

## 2022-02-21 LAB
ALBUMIN SERPL BCP-MCNC: 2.1 G/DL (ref 3.4–4.8)
ALT SERPL W P-5'-P-CCNC: 97 U/L (ref 12–78)
ANION GAP SERPL CALCULATED.3IONS-SCNC: 9 MMOL/L (ref 5–15)
AST SERPL W P-5'-P-CCNC: 39 U/L (ref 10–37)
BASOPHILS # BLD AUTO: 0.1 K/UL (ref 0–0.2)
BASOPHILS NFR BLD AUTO: 0.7 % (ref 0–2)
BILIRUB SERPL-MCNC: 0.3 MG/DL (ref 0–1)
BUN SERPL-MCNC: 30 MG/DL (ref 8–21)
CALCIUM SERPL-MCNC: 7.3 MG/DL (ref 8.4–11)
CHLORIDE SERPL-SCNC: 120 MMOL/L (ref 98–107)
CREAT SERPL-MCNC: 0.42 MG/DL (ref 0.55–1.3)
EOSINOPHIL # BLD AUTO: 0 K/UL (ref 0–0.4)
EOSINOPHIL NFR BLD AUTO: 0.1 % (ref 0–4)
ERYTHROCYTE [DISTWIDTH] IN BLOOD BY AUTOMATED COUNT: 22.8 % (ref 9–15)
GFR SERPL CREATININE-BSD FRML MDRD: (no result) ML/MIN (ref 90–?)
GLUCOSE SERPL-MCNC: 331 MG/DL (ref 70–99)
HCT VFR BLD AUTO: 33.4 % (ref 36–54)
HGB BLD-MCNC: 9.9 G/DL (ref 14–18)
LYMPHOCYTES # BLD AUTO: 1.8 K/UL (ref 1–5.5)
LYMPHOCYTES NFR BLD AUTO: 14.5 % (ref 20.5–51.5)
MCH RBC QN AUTO: 21 PG (ref 27–31)
MCHC RBC AUTO-ENTMCNC: 30 % (ref 32–36)
MCV RBC AUTO: 71 FL (ref 79–98)
MONOCYTES # BLD MANUAL: 0.8 K/UL (ref 0–1)
MONOCYTES # BLD MANUAL: 6.6 % (ref 1.7–9.3)
NEUTROPHILS # BLD AUTO: 9.4 K/UL (ref 1.8–7.7)
NEUTROPHILS NFR BLD AUTO: 78.1 % (ref 40–70)
PHOSPHATE SERPL-MCNC: 2.6 MG/DL (ref 2.7–4.5)
PLATELET # BLD AUTO: 351 K/UL (ref 130–430)
POTASSIUM SERPL-SCNC: 2.9 MMOL/L (ref 3.5–5.1)
RBC # BLD AUTO: 4.74 MIL/UL (ref 4.2–6.2)
SODIUM SERPLBLD-SCNC: 155 MMOL/L (ref 136–145)
WBC # BLD AUTO: 12.1 K/UL (ref 4.8–10.8)

## 2022-02-21 RX ADMIN — INSULIN HUMAN PRN MLS/HR: 100 INJECTION, SOLUTION PARENTERAL at 20:53

## 2022-02-21 RX ADMIN — HYDROMORPHONE HYDROCHLORIDE PRN MG: 8 TABLET ORAL at 21:14

## 2022-02-21 RX ADMIN — METHYLPREDNISOLONE SCH MG: 40 INJECTION, POWDER, LYOPHILIZED, FOR SOLUTION INTRAMUSCULAR; INTRAVENOUS at 16:00

## 2022-02-21 RX ADMIN — ENOXAPARIN SODIUM SCH MG: 40 INJECTION SUBCUTANEOUS at 09:23

## 2022-02-21 RX ADMIN — DEXTROSE MONOHYDRATE SCH MLS/HR: 50 INJECTION, SOLUTION INTRAVENOUS at 05:43

## 2022-02-21 RX ADMIN — HYDROMORPHONE HYDROCHLORIDE PRN MG: 8 TABLET ORAL at 14:30

## 2022-02-21 RX ADMIN — INSULIN HUMAN PRN MLS/HR: 100 INJECTION, SOLUTION PARENTERAL at 05:48

## 2022-02-21 RX ADMIN — ALBUTEROL SULFATE SCH PUFFS: 90 AEROSOL, METERED RESPIRATORY (INHALATION) at 19:30

## 2022-02-21 RX ADMIN — Medication SCH UNIT: at 09:20

## 2022-02-21 RX ADMIN — FUROSEMIDE SCH MG: 10 INJECTION, SOLUTION INTRAMUSCULAR; INTRAVENOUS at 09:19

## 2022-02-21 RX ADMIN — METHYLPREDNISOLONE SCH MG: 40 INJECTION, POWDER, LYOPHILIZED, FOR SOLUTION INTRAMUSCULAR; INTRAVENOUS at 05:44

## 2022-02-21 RX ADMIN — DEXTROSE MONOHYDRATE SCH MLS/HR: 50 INJECTION, SOLUTION INTRAVENOUS at 01:28

## 2022-02-21 RX ADMIN — SACUBITRIL AND VALSARTAN SCH TABLET: 24; 26 TABLET, FILM COATED ORAL at 21:13

## 2022-02-21 RX ADMIN — OXYCODONE HYDROCHLORIDE AND ACETAMINOPHEN SCH MG: 500 TABLET ORAL at 09:20

## 2022-02-21 RX ADMIN — INSULIN HUMAN PRN MLS/HR: 100 INJECTION, SOLUTION PARENTERAL at 01:53

## 2022-02-21 RX ADMIN — SODIUM CHLORIDE SCH MG: 9 INJECTION, SOLUTION INTRAVENOUS at 09:20

## 2022-02-21 RX ADMIN — SACUBITRIL AND VALSARTAN SCH TABLET: 24; 26 TABLET, FILM COATED ORAL at 09:21

## 2022-02-21 RX ADMIN — METHYLPREDNISOLONE SCH MG: 40 INJECTION, POWDER, LYOPHILIZED, FOR SOLUTION INTRAMUSCULAR; INTRAVENOUS at 21:13

## 2022-02-21 RX ADMIN — INSULIN HUMAN PRN MLS/HR: 100 INJECTION, SOLUTION PARENTERAL at 12:26

## 2022-02-21 RX ADMIN — DEXTROSE MONOHYDRATE SCH MLS/HR: 50 INJECTION, SOLUTION INTRAVENOUS at 12:27

## 2022-02-21 NOTE — NUR
Due meds Given, tolerated well, will continue to monitor for signs & symptoms of adverse 
reactions.

## 2022-02-21 NOTE — NUR
Total care done, pericare given, BM X1, loose, Light brown, moderate, Change gown Linen, 
[place]aul[place] , fitted sheet. Made Clean, Dry & comfortable.

## 2022-02-21 NOTE — NUR
Nutrition F/U



RD reviewed pt's current EMR record including diet Hx, physician notes, nursing notes, 
pertinent labs/meds/procedures, care trends, and care activity.



Admitting Diagnosis 

     CHF, influenza, COVID

Medical History Comment: 

     DM, PE in R arm, RA, and gout per physician notes

     

     Pt also found w/ septic shock per physician notes

     

     SARS-CoV-2 Ag (Rapid) Positive  & Influenza B Positive 



Subjective Information: RD rounded to ICU and spoke w/ pt's primary RN outside of pt's room. 
Pt remains on airborne isolation precautions a/w COVID. She stated pt has been tolerating TF 
well, no GRV, and BM yesterday, loose/pasty consistency. She reported that pt was restarted 
on sedation last night, and unable to perform CPAP trial today. RN also confirmed use of 
Prosource BID supplement, and reported that cardiologist adjusted water flush to 240 ml Q4h 
(1440 ml/day) d/t elevated sodium level. Current TF prescription remains 
adequate/appropriate at this time.

      

Current Diet Order/Nutrition Support: Vital AF 1.2 at 50 ml/hr (goal rate); if bolus 
feedin ml Q6h (1200 ml/day), 

Prosource BID, Free Water Flush: per physician d/t CHF via OGT x4 days



Patient/Significant Other 

     Unable To Verbalize

Education Provided 

     Not Indicated

Pertinent Medications 

     zinc, VIT D3, VIT C, protonix IV, lasix, lovenox, piperacillin/tazobactam IV, propofol 
at 5.756 ml/hr (152 kcal/day)

Pertinent Labs 

     WBC 12.1 H, Na 155 H, Cl 120 H, K 2.9 L, BUN 30 H,  H, ALB: 2.1 L, HgA1c 6.9 H

Height (Feet) 

     5 feet

Height (Inches) 

     5.00 inches

Weight (Pounds) 

     141 pounds

Weight (Calculated Kilograms) 

     63.002563 kilograms

Patient Weight

     63.957 kg -- stable since 

Body Mass Index

     23.46 kg/m2

%IBW 

     104

Ideal/Adjusted Body Weight 

     136#/62 kg

Recent Weight Change 

     Unable to verify

Weight Status 

     Appropriate

Food Allergies 

     Unable to verify

NEW Estimated Energy Expenditure (kcals/day) 

     1679 (PSU  d/t critical illness, intubated; Ve: 11.2, Tmax: 37.6'C)

Estimated Protein Required (g/day) 

      (1.5-2 gm/kg CBW d/t sepsis)

Estimated Fluid Required (l/day) 

     Per physician d/t CHF



Problem/Etiology/Signs/Symptoms 

     Increased nutritional needs R/T metabolic demands AEB estimated nutritional 
requirements for sepsis.

*Ongoing

     Altered nutrition-related labs R/T endocrine dysfunction AEB elevated BG lab values.

*Ongoing

Expected Outcomes/Goals 

     - Monitor provision of EN support w/ goal of pt meeting at least 75% of 

     estimated nutritional needs, labs trending WNL, normal GI function, and 

     skin integrity/wt maintenance, weaning status

Dietitian Recommendations 

     * Vital AF 1.2 to 50 ml/hr; if bolus feedin ml Q6h (1200 kcal/day), 

     Free Water Flush: 240 ml Q4h (per physician d/t CHF) via OGT  

     Provides 1560 kcal/day, 120 gm protein/day, and 2413 ml free water/day

     Meets: 93% of estimated caloric needs and 94% of upper end of estimated nutritional 
needs

Follow Up 

     High Risk: F/U in 2-3 days

## 2022-02-21 NOTE — NUR
Dietitian Recommendations 



     * Vital AF 1.2 to 50 ml/hr; if bolus feedin ml Q6h (1200 kcal/day), 

     Free Water Flush: 240 ml Q4h (per physician d/t CHF) via OGT  

     Provides 1560 kcal/day, 120 gm protein/day, and 2413 ml free water/day

     Meets: 93% of estimated caloric needs and 94% of upper end of estimated nutritional 
needs

LP, RD



Please refer to Nutrition F/U for details.

## 2022-02-22 VITALS — SYSTOLIC BLOOD PRESSURE: 111 MMHG

## 2022-02-22 VITALS — SYSTOLIC BLOOD PRESSURE: 120 MMHG

## 2022-02-22 VITALS — SYSTOLIC BLOOD PRESSURE: 130 MMHG

## 2022-02-22 VITALS — SYSTOLIC BLOOD PRESSURE: 117 MMHG

## 2022-02-22 VITALS — SYSTOLIC BLOOD PRESSURE: 121 MMHG

## 2022-02-22 VITALS — SYSTOLIC BLOOD PRESSURE: 135 MMHG

## 2022-02-22 VITALS — SYSTOLIC BLOOD PRESSURE: 128 MMHG

## 2022-02-22 VITALS — SYSTOLIC BLOOD PRESSURE: 154 MMHG

## 2022-02-22 VITALS — SYSTOLIC BLOOD PRESSURE: 109 MMHG

## 2022-02-22 VITALS — SYSTOLIC BLOOD PRESSURE: 161 MMHG

## 2022-02-22 VITALS — SYSTOLIC BLOOD PRESSURE: 107 MMHG

## 2022-02-22 VITALS — SYSTOLIC BLOOD PRESSURE: 144 MMHG

## 2022-02-22 VITALS — SYSTOLIC BLOOD PRESSURE: 113 MMHG

## 2022-02-22 VITALS — SYSTOLIC BLOOD PRESSURE: 131 MMHG

## 2022-02-22 VITALS — SYSTOLIC BLOOD PRESSURE: 108 MMHG

## 2022-02-22 VITALS — SYSTOLIC BLOOD PRESSURE: 114 MMHG

## 2022-02-22 VITALS — SYSTOLIC BLOOD PRESSURE: 116 MMHG

## 2022-02-22 VITALS — SYSTOLIC BLOOD PRESSURE: 122 MMHG

## 2022-02-22 VITALS — SYSTOLIC BLOOD PRESSURE: 124 MMHG

## 2022-02-22 VITALS — SYSTOLIC BLOOD PRESSURE: 125 MMHG

## 2022-02-22 VITALS — SYSTOLIC BLOOD PRESSURE: 133 MMHG

## 2022-02-22 VITALS — SYSTOLIC BLOOD PRESSURE: 142 MMHG

## 2022-02-22 VITALS — SYSTOLIC BLOOD PRESSURE: 126 MMHG

## 2022-02-22 VITALS — SYSTOLIC BLOOD PRESSURE: 123 MMHG

## 2022-02-22 VITALS — SYSTOLIC BLOOD PRESSURE: 115 MMHG

## 2022-02-22 VITALS — SYSTOLIC BLOOD PRESSURE: 119 MMHG

## 2022-02-22 LAB
ALBUMIN SERPL BCP-MCNC: 2.2 G/DL (ref 3.4–4.8)
ALT SERPL W P-5'-P-CCNC: 112 U/L (ref 12–78)
ANION GAP SERPL CALCULATED.3IONS-SCNC: 9 MMOL/L (ref 5–15)
AST SERPL W P-5'-P-CCNC: 29 U/L (ref 10–37)
BASOPHILS # BLD AUTO: 0 K/UL (ref 0–0.2)
BASOPHILS NFR BLD AUTO: 0.2 % (ref 0–2)
BILIRUB SERPL-MCNC: 0.4 MG/DL (ref 0–1)
BUN SERPL-MCNC: 29 MG/DL (ref 8–21)
CALCIUM SERPL-MCNC: 7.2 MG/DL (ref 8.4–11)
CHLORIDE SERPL-SCNC: 121 MMOL/L (ref 98–107)
CREAT SERPL-MCNC: 0.41 MG/DL (ref 0.55–1.3)
EOSINOPHIL # BLD AUTO: 0 K/UL (ref 0–0.4)
EOSINOPHIL NFR BLD AUTO: 0.1 % (ref 0–4)
ERYTHROCYTE [DISTWIDTH] IN BLOOD BY AUTOMATED COUNT: 23.8 % (ref 9–15)
GFR SERPL CREATININE-BSD FRML MDRD: (no result) ML/MIN (ref 90–?)
GLUCOSE SERPL-MCNC: 339 MG/DL (ref 70–99)
HCT VFR BLD AUTO: 33.8 % (ref 36–54)
HGB BLD-MCNC: 10 G/DL (ref 14–18)
LYMPHOCYTES # BLD AUTO: 1.7 K/UL (ref 1–5.5)
LYMPHOCYTES NFR BLD AUTO: 13.9 % (ref 20.5–51.5)
MCH RBC QN AUTO: 21 PG (ref 27–31)
MCHC RBC AUTO-ENTMCNC: 30 % (ref 32–36)
MCV RBC AUTO: 71 FL (ref 79–98)
MONOCYTES # BLD MANUAL: 0.7 K/UL (ref 0–1)
MONOCYTES # BLD MANUAL: 5.4 % (ref 1.7–9.3)
NEUTROPHILS # BLD AUTO: 10 K/UL (ref 1.8–7.7)
NEUTROPHILS NFR BLD AUTO: 80.4 % (ref 40–70)
PLATELET # BLD AUTO: 311 K/UL (ref 130–430)
POTASSIUM SERPL-SCNC: 3.3 MMOL/L (ref 3.5–5.1)
RBC # BLD AUTO: 4.78 MIL/UL (ref 4.2–6.2)
SODIUM SERPLBLD-SCNC: 155 MMOL/L (ref 136–145)
WBC # BLD AUTO: 12.4 K/UL (ref 4.8–10.8)

## 2022-02-22 RX ADMIN — INSULIN HUMAN PRN MLS/HR: 100 INJECTION, SOLUTION PARENTERAL at 12:44

## 2022-02-22 RX ADMIN — ALBUTEROL SULFATE SCH PUFFS: 90 AEROSOL, METERED RESPIRATORY (INHALATION) at 11:27

## 2022-02-22 RX ADMIN — SACUBITRIL AND VALSARTAN SCH TABLET: 24; 26 TABLET, FILM COATED ORAL at 09:26

## 2022-02-22 RX ADMIN — Medication SCH EA: at 12:00

## 2022-02-22 RX ADMIN — INSULIN HUMAN PRN MLS/HR: 100 INJECTION, SOLUTION PARENTERAL at 06:18

## 2022-02-22 RX ADMIN — ALBUTEROL SULFATE SCH PUFFS: 90 AEROSOL, METERED RESPIRATORY (INHALATION) at 19:31

## 2022-02-22 RX ADMIN — Medication SCH EA: at 00:52

## 2022-02-22 RX ADMIN — Medication SCH EA: at 19:07

## 2022-02-22 RX ADMIN — ENOXAPARIN SODIUM SCH MG: 40 INJECTION SUBCUTANEOUS at 09:24

## 2022-02-22 RX ADMIN — OXYCODONE HYDROCHLORIDE AND ACETAMINOPHEN SCH MG: 500 TABLET ORAL at 09:19

## 2022-02-22 RX ADMIN — METHYLPREDNISOLONE SCH MG: 40 INJECTION, POWDER, LYOPHILIZED, FOR SOLUTION INTRAMUSCULAR; INTRAVENOUS at 21:52

## 2022-02-22 RX ADMIN — Medication SCH EA: at 06:10

## 2022-02-22 RX ADMIN — INSULIN GLARGINE SCH UNITS: 100 INJECTION, SOLUTION SUBCUTANEOUS at 10:16

## 2022-02-22 RX ADMIN — ALBUTEROL SULFATE SCH PUFFS: 90 AEROSOL, METERED RESPIRATORY (INHALATION) at 07:39

## 2022-02-22 RX ADMIN — FUROSEMIDE SCH MG: 10 INJECTION, SOLUTION INTRAMUSCULAR; INTRAVENOUS at 09:18

## 2022-02-22 RX ADMIN — ALBUTEROL SULFATE SCH PUFFS: 90 AEROSOL, METERED RESPIRATORY (INHALATION) at 00:42

## 2022-02-22 RX ADMIN — METHYLPREDNISOLONE SCH MG: 40 INJECTION, POWDER, LYOPHILIZED, FOR SOLUTION INTRAMUSCULAR; INTRAVENOUS at 14:09

## 2022-02-22 RX ADMIN — SODIUM CHLORIDE SCH MG: 9 INJECTION, SOLUTION INTRAVENOUS at 09:13

## 2022-02-22 RX ADMIN — SACUBITRIL AND VALSARTAN SCH TABLET: 24; 26 TABLET, FILM COATED ORAL at 21:48

## 2022-02-22 RX ADMIN — Medication SCH EA: at 23:57

## 2022-02-22 RX ADMIN — METHYLPREDNISOLONE SCH MG: 40 INJECTION, POWDER, LYOPHILIZED, FOR SOLUTION INTRAMUSCULAR; INTRAVENOUS at 06:09

## 2022-02-22 RX ADMIN — Medication SCH UNIT: at 09:19

## 2022-02-22 NOTE — NUR
RT NOTES

Vent settings CPAP 5 PS 12, No distress noted. Will monitor pt. RN notified. Pt was 
educated, nodded for understanding.

## 2022-02-22 NOTE — NUR
RT NOTES

Pt is awake, appears to respond to simple questions appropriately. Nodded when asked if 
education provided about the weaning process was understood. Vent to SIMV of 8. No adverse 
reactions noted. will monitor pt. Rn notified.

## 2022-02-22 NOTE — NUR
RT NOTES

Pt cont. to tolerate SIMV 8, after ABG was drawn, decreased rate to 6. Will monitor pt. RN 
notified.

## 2022-02-22 NOTE — NUR
Received pt from KALIN Andrea, patient is arousable to voice, coherent and is able to nod when 
asked. Pt remains in isolation for Covid 19. No s/s of distress. Patient remains intubated 
with ventilator with the following setting SIMV 6, TV= 450, FIO2= 45%, PS=12, PEEP=5, 
saturating 97%. VS are within acceptable range with heart rate on the 50's Paced (pt with 
AICD). No s/s of active bleeding. All lines, catheter and tubings are intact. Possible 
extubation in AM. 

-------------------------------------------------------------------------------

Addendum: 02/22/22 at 2026 by Gardner Registry RN

-------------------------------------------------------------------------------

pt is currently on Precedex drip at 1 mcg/kg/hr tolerating well. 

-------------------------------------------------------------------------------

Addendum: 02/22/22 at 2227 by Gardner Registry RN

-------------------------------------------------------------------------------

20:00 Pt received on bilateral soft wrist restraints. Writer removed both restraints as 
patient is cooperative and coherent. Unsafe behaviors like pulling out medical devices not 
observed. Will continue to monitor if pt needs restraints. Will keep pt OFF restraints for 
now.

## 2022-02-22 NOTE — NUR
Late entry. Noted a small stage 2 (tear) wound with purplish discoloration around the wound 
on sacral area. Foam dressing is dry and intact. To reposition q 2 hours.

## 2022-02-22 NOTE — NUR
Turn & reposition q2H for comfort.  No complaint of pain or discomfort noted at this time. 
Will continue monitoring Vital signs as ordered.

## 2022-02-23 VITALS — SYSTOLIC BLOOD PRESSURE: 119 MMHG

## 2022-02-23 VITALS — SYSTOLIC BLOOD PRESSURE: 122 MMHG

## 2022-02-23 VITALS — SYSTOLIC BLOOD PRESSURE: 110 MMHG

## 2022-02-23 VITALS — SYSTOLIC BLOOD PRESSURE: 115 MMHG

## 2022-02-23 VITALS — SYSTOLIC BLOOD PRESSURE: 117 MMHG

## 2022-02-23 VITALS — SYSTOLIC BLOOD PRESSURE: 118 MMHG

## 2022-02-23 VITALS — SYSTOLIC BLOOD PRESSURE: 112 MMHG

## 2022-02-23 VITALS — SYSTOLIC BLOOD PRESSURE: 127 MMHG

## 2022-02-23 VITALS — SYSTOLIC BLOOD PRESSURE: 123 MMHG

## 2022-02-23 VITALS — SYSTOLIC BLOOD PRESSURE: 111 MMHG

## 2022-02-23 VITALS — SYSTOLIC BLOOD PRESSURE: 105 MMHG

## 2022-02-23 VITALS — SYSTOLIC BLOOD PRESSURE: 135 MMHG

## 2022-02-23 VITALS — SYSTOLIC BLOOD PRESSURE: 120 MMHG

## 2022-02-23 VITALS — SYSTOLIC BLOOD PRESSURE: 136 MMHG

## 2022-02-23 VITALS — SYSTOLIC BLOOD PRESSURE: 96 MMHG

## 2022-02-23 VITALS — SYSTOLIC BLOOD PRESSURE: 124 MMHG

## 2022-02-23 LAB
ANION GAP SERPL CALCULATED.3IONS-SCNC: 6 MMOL/L (ref 5–15)
BASOPHILS # BLD AUTO: 0 K/UL (ref 0–0.2)
BASOPHILS NFR BLD AUTO: 0.5 % (ref 0–2)
BUN SERPL-MCNC: 29 MG/DL (ref 8–21)
CALCIUM SERPL-MCNC: 7.3 MG/DL (ref 8.4–11)
CHLORIDE SERPL-SCNC: 118 MMOL/L (ref 98–107)
CREAT SERPL-MCNC: 0.43 MG/DL (ref 0.55–1.3)
EOSINOPHIL # BLD AUTO: 0 K/UL (ref 0–0.4)
EOSINOPHIL NFR BLD AUTO: 0.1 % (ref 0–4)
ERYTHROCYTE [DISTWIDTH] IN BLOOD BY AUTOMATED COUNT: 24 % (ref 9–15)
GFR SERPL CREATININE-BSD FRML MDRD: (no result) ML/MIN (ref 90–?)
GLUCOSE SERPL-MCNC: 289 MG/DL (ref 70–99)
HCT VFR BLD AUTO: 32.4 % (ref 36–54)
HGB BLD-MCNC: 9.9 G/DL (ref 14–18)
LYMPHOCYTES # BLD AUTO: 1.9 K/UL (ref 1–5.5)
LYMPHOCYTES NFR BLD AUTO: 19 % (ref 20.5–51.5)
MCH RBC QN AUTO: 22 PG (ref 27–31)
MCHC RBC AUTO-ENTMCNC: 31 % (ref 32–36)
MCV RBC AUTO: 71 FL (ref 79–98)
MONOCYTES # BLD MANUAL: 0.6 K/UL (ref 0–1)
MONOCYTES # BLD MANUAL: 5.8 % (ref 1.7–9.3)
NEUTROPHILS # BLD AUTO: 7.6 K/UL (ref 1.8–7.7)
NEUTROPHILS NFR BLD AUTO: 74.6 % (ref 40–70)
PLATELET # BLD AUTO: 240 K/UL (ref 130–430)
POTASSIUM SERPL-SCNC: 3.9 MMOL/L (ref 3.5–5.1)
RBC # BLD AUTO: 4.55 MIL/UL (ref 4.2–6.2)
SODIUM SERPLBLD-SCNC: 151 MMOL/L (ref 136–145)
WBC # BLD AUTO: 10.2 K/UL (ref 4.8–10.8)

## 2022-02-23 RX ADMIN — SODIUM CHLORIDE SCH MG: 9 INJECTION, SOLUTION INTRAVENOUS at 09:37

## 2022-02-23 RX ADMIN — ENOXAPARIN SODIUM SCH MG: 40 INJECTION SUBCUTANEOUS at 11:07

## 2022-02-23 RX ADMIN — METHYLPREDNISOLONE SCH MG: 40 INJECTION, POWDER, LYOPHILIZED, FOR SOLUTION INTRAMUSCULAR; INTRAVENOUS at 21:40

## 2022-02-23 RX ADMIN — Medication SCH UNIT: at 09:38

## 2022-02-23 RX ADMIN — SACUBITRIL AND VALSARTAN SCH TABLET: 24; 26 TABLET, FILM COATED ORAL at 21:39

## 2022-02-23 RX ADMIN — SODIUM CHLORIDE SCH MLS/HR: 450 INJECTION, SOLUTION INTRAVENOUS at 18:53

## 2022-02-23 RX ADMIN — ALBUTEROL SULFATE SCH PUFFS: 90 AEROSOL, METERED RESPIRATORY (INHALATION) at 00:58

## 2022-02-23 RX ADMIN — ALBUTEROL SULFATE SCH PUFFS: 90 AEROSOL, METERED RESPIRATORY (INHALATION) at 11:56

## 2022-02-23 RX ADMIN — INSULIN HUMAN PRN MLS/HR: 100 INJECTION, SOLUTION PARENTERAL at 01:11

## 2022-02-23 RX ADMIN — HYDROMORPHONE HYDROCHLORIDE PRN MG: 8 TABLET ORAL at 21:49

## 2022-02-23 RX ADMIN — FUROSEMIDE SCH MG: 10 INJECTION, SOLUTION INTRAMUSCULAR; INTRAVENOUS at 09:37

## 2022-02-23 RX ADMIN — ALBUTEROL SULFATE SCH PUFFS: 90 AEROSOL, METERED RESPIRATORY (INHALATION) at 07:08

## 2022-02-23 RX ADMIN — INSULIN GLARGINE SCH UNITS: 100 INJECTION, SOLUTION SUBCUTANEOUS at 11:26

## 2022-02-23 RX ADMIN — SACUBITRIL AND VALSARTAN SCH TABLET: 24; 26 TABLET, FILM COATED ORAL at 11:00

## 2022-02-23 RX ADMIN — ALBUTEROL SULFATE SCH PUFFS: 90 AEROSOL, METERED RESPIRATORY (INHALATION) at 19:00

## 2022-02-23 RX ADMIN — OXYCODONE HYDROCHLORIDE AND ACETAMINOPHEN SCH MG: 500 TABLET ORAL at 09:38

## 2022-02-23 RX ADMIN — Medication SCH EA: at 06:00

## 2022-02-23 RX ADMIN — ALBUTEROL SULFATE SCH PUFFS: 90 AEROSOL, METERED RESPIRATORY (INHALATION) at 13:40

## 2022-02-23 NOTE — NUR
ST EVALUATION COMPLETED.  ST TX NOT INDICATED AT THIS TIME.  RECOMMEND PO DIET OF 
PUREE/NECTAR THICK LIQUIDS.  1:1 FEEDER AND FULL ASPIRATION PRECAUTIONS.

## 2022-02-23 NOTE — NUR
RT NOTES

Per dr's order, vent to CPAP 5 PS 12 40%. No adverse reactions noted. Pt. responded 
appropriately, nodded when asked if he understood education provided regarding weaning 
trials.

## 2022-02-23 NOTE — NUR
RT NOTES

Before cuff deflation, oral sxn performed. Per Dr 's order, pt. was extubated and placed on 
4L nc. No immediate adverse reactions noted, no SOB noted, pt. follows instructions well. 
Pt. was re-educated on deep breathing exercise as well. Will cont. to monitor pt. Rn at 
bedside during extubation.

## 2022-02-23 NOTE — NUR
Pt extubated and placed O2 at 2L N/C, O@ sat 98%, no signs of respiratory distress, V/S 
remain stable.

## 2022-02-23 NOTE — NUR
There is no significant change on pt's status at this time. Patient was placed back on 
bilateral soft wrists restraints as he tried to pull out his ETT. Otherwise, patient's VS 
are within acceptable range. Precedex remains at 1 mcg/kg/hr. No s/s of distress. OG tube 
feeding is well tolerated.

## 2022-02-24 VITALS — SYSTOLIC BLOOD PRESSURE: 115 MMHG

## 2022-02-24 VITALS — SYSTOLIC BLOOD PRESSURE: 120 MMHG

## 2022-02-24 VITALS — SYSTOLIC BLOOD PRESSURE: 123 MMHG

## 2022-02-24 VITALS — SYSTOLIC BLOOD PRESSURE: 118 MMHG

## 2022-02-24 VITALS — SYSTOLIC BLOOD PRESSURE: 141 MMHG

## 2022-02-24 VITALS — SYSTOLIC BLOOD PRESSURE: 114 MMHG

## 2022-02-24 VITALS — SYSTOLIC BLOOD PRESSURE: 126 MMHG

## 2022-02-24 VITALS — SYSTOLIC BLOOD PRESSURE: 134 MMHG

## 2022-02-24 VITALS — SYSTOLIC BLOOD PRESSURE: 136 MMHG

## 2022-02-24 VITALS — SYSTOLIC BLOOD PRESSURE: 116 MMHG

## 2022-02-24 VITALS — SYSTOLIC BLOOD PRESSURE: 121 MMHG

## 2022-02-24 LAB
ALBUMIN SERPL BCP-MCNC: 2.3 G/DL (ref 3.4–4.8)
ALT SERPL W P-5'-P-CCNC: 91 U/L (ref 12–78)
ANION GAP SERPL CALCULATED.3IONS-SCNC: 8 MMOL/L (ref 5–15)
AST SERPL W P-5'-P-CCNC: 29 U/L (ref 10–37)
BASOPHILS # BLD AUTO: 0 K/UL (ref 0–0.2)
BASOPHILS NFR BLD AUTO: 0.2 % (ref 0–2)
BILIRUB SERPL-MCNC: 0.7 MG/DL (ref 0–1)
BUN SERPL-MCNC: 33 MG/DL (ref 8–21)
CALCIUM SERPL-MCNC: 7.5 MG/DL (ref 8.4–11)
CHLORIDE SERPL-SCNC: 112 MMOL/L (ref 98–107)
CREAT SERPL-MCNC: 0.51 MG/DL (ref 0.55–1.3)
EOSINOPHIL # BLD AUTO: 0 K/UL (ref 0–0.4)
EOSINOPHIL NFR BLD AUTO: 0.1 % (ref 0–4)
ERYTHROCYTE [DISTWIDTH] IN BLOOD BY AUTOMATED COUNT: 24 % (ref 9–15)
GFR SERPL CREATININE-BSD FRML MDRD: (no result) ML/MIN (ref 90–?)
GLUCOSE SERPL-MCNC: 208 MG/DL (ref 70–99)
HCT VFR BLD AUTO: 34.5 % (ref 36–54)
HGB BLD-MCNC: 10.4 G/DL (ref 14–18)
LYMPHOCYTES # BLD AUTO: 1.7 K/UL (ref 1–5.5)
LYMPHOCYTES NFR BLD AUTO: 13.4 % (ref 20.5–51.5)
MCH RBC QN AUTO: 21 PG (ref 27–31)
MCHC RBC AUTO-ENTMCNC: 30 % (ref 32–36)
MCV RBC AUTO: 71 FL (ref 79–98)
MONOCYTES # BLD MANUAL: 0.4 K/UL (ref 0–1)
MONOCYTES # BLD MANUAL: 2.8 % (ref 1.7–9.3)
NEUTROPHILS # BLD AUTO: 10.6 K/UL (ref 1.8–7.7)
NEUTROPHILS NFR BLD AUTO: 83.5 % (ref 40–70)
PLATELET # BLD AUTO: 232 K/UL (ref 130–430)
POTASSIUM SERPL-SCNC: 3.3 MMOL/L (ref 3.5–5.1)
RBC # BLD AUTO: 4.88 MIL/UL (ref 4.2–6.2)
SODIUM SERPLBLD-SCNC: 148 MMOL/L (ref 136–145)
WBC # BLD AUTO: 12.7 K/UL (ref 4.8–10.8)

## 2022-02-24 RX ADMIN — SACUBITRIL AND VALSARTAN SCH TABLET: 24; 26 TABLET, FILM COATED ORAL at 20:49

## 2022-02-24 RX ADMIN — INSULIN GLARGINE SCH UNITS: 100 INJECTION, SOLUTION SUBCUTANEOUS at 09:09

## 2022-02-24 RX ADMIN — ALBUTEROL SULFATE SCH PUFFS: 90 AEROSOL, METERED RESPIRATORY (INHALATION) at 20:00

## 2022-02-24 RX ADMIN — Medication SCH UNIT: at 08:46

## 2022-02-24 RX ADMIN — SACUBITRIL AND VALSARTAN SCH TABLET: 24; 26 TABLET, FILM COATED ORAL at 09:03

## 2022-02-24 RX ADMIN — METHYLPREDNISOLONE SCH MG: 40 INJECTION, POWDER, LYOPHILIZED, FOR SOLUTION INTRAMUSCULAR; INTRAVENOUS at 06:26

## 2022-02-24 RX ADMIN — OXYCODONE HYDROCHLORIDE AND ACETAMINOPHEN SCH MG: 500 TABLET ORAL at 08:46

## 2022-02-24 RX ADMIN — Medication SCH EA: at 12:39

## 2022-02-24 RX ADMIN — ALBUTEROL SULFATE SCH PUFFS: 90 AEROSOL, METERED RESPIRATORY (INHALATION) at 00:03

## 2022-02-24 RX ADMIN — Medication SCH EA: at 07:10

## 2022-02-24 RX ADMIN — SODIUM CHLORIDE SCH MG: 9 INJECTION, SOLUTION INTRAVENOUS at 08:52

## 2022-02-24 RX ADMIN — SODIUM CHLORIDE SCH MLS/HR: 450 INJECTION, SOLUTION INTRAVENOUS at 08:44

## 2022-02-24 RX ADMIN — FUROSEMIDE SCH MG: 10 INJECTION, SOLUTION INTRAMUSCULAR; INTRAVENOUS at 08:52

## 2022-02-24 RX ADMIN — Medication SCH EA: at 17:40

## 2022-02-24 RX ADMIN — METHYLPREDNISOLONE SCH MG: 40 INJECTION, POWDER, LYOPHILIZED, FOR SOLUTION INTRAMUSCULAR; INTRAVENOUS at 13:02

## 2022-02-24 RX ADMIN — HYDROMORPHONE HYDROCHLORIDE PRN MG: 8 TABLET ORAL at 08:42

## 2022-02-24 RX ADMIN — ALBUTEROL SULFATE SCH PUFFS: 90 AEROSOL, METERED RESPIRATORY (INHALATION) at 14:00

## 2022-02-24 RX ADMIN — ALBUTEROL SULFATE SCH PUFFS: 90 AEROSOL, METERED RESPIRATORY (INHALATION) at 07:50

## 2022-02-24 RX ADMIN — CARVEDILOL SCH MG: 6.25 TABLET, FILM COATED ORAL at 20:48

## 2022-02-24 RX ADMIN — ENOXAPARIN SODIUM SCH MG: 40 INJECTION SUBCUTANEOUS at 09:10

## 2022-02-24 RX ADMIN — METHYLPREDNISOLONE SCH MG: 40 INJECTION, POWDER, LYOPHILIZED, FOR SOLUTION INTRAMUSCULAR; INTRAVENOUS at 20:47

## 2022-02-24 NOTE — NUR
Nutrition F/U



RD reviewed pt's current EMR record including diet Hx, physician notes, nursing notes, 
pertinent labs/meds/procedures, care trends, and care activity.



Admitting Diagnosis 

     CHF, influenza, COVID

Medical History Comment: 

     DM, PE in R arm, RA, and gout per physician notes

     

     Pt also found w/ septic shock per physician notes

     

     SARS-CoV-2 Ag (Rapid) Positive 2/11 & Influenza B Positive 2/11



Subjective Information: Pt remains on airborne isolation precautions a/w COVID. RD bedside 
visit deferred. Pt's primary RN was not present in unit at time of RD visit. RD spoke w/ 
charge RN who reported that pt is tolerating pureed diet, and may benefit from texture 
advancement. She stated pt does not an additional ST swallow eval for this. Per EMR review, 
pt was extubated yesterday, 2/23 and seen by ST for swallow eval, at which time, ST rec for 
puree/NTL diet w/ 1:1 feeder and full aspiration precautions; pt is on 2 L O2 via NC; plan 
to downgrade to telemetry unit; AM labs; no PO intake records documented yet; TF was D/C 
yesterday; active bowel sounds; Dwayne scale: 15, no PIs noted. Current diet is appropriate, 
however, pt may not yet be meeting nutritional needs.

      

Current Diet Order/Nutrition Support: Pureed, CCHO low carb-45, cardiac, NTL diet x0 days



Patient/Significant Other 

     Unable To Verbalize

Education Provided 

     Not Indicated

Pertinent Medications 

     lantus, zinc, VIT C, protonix IV, lasix, lovenox, solu-medrol

Pertinent Labs 

     WBC 12.7 H, Na 148 H, Cl 112 H, K 3.3 L, BUN 33 H,  H, ALB 2.3 L, HgA1c 6.9 H 
(2/12)

Height (Feet) 

     5 feet

Height (Inches) 

     5.00 inches

Weight (Pounds) 

     141 pounds

Weight (Calculated Kilograms) 

     63.991297 kilograms

Patient Weight

     63.957 kg -- stable since 2/13

Body Mass Index

     23.46 kg/m2

%IBW 

     104

Ideal/Adjusted Body Weight 

     136#/62 kg

Recent Weight Change 

     Unable to verify

Weight Status 

     Appropriate

Food Allergies 

     Unable to verify

NEW Estimated Energy Expenditure (kcals/day) 

     9946-7568 (30-35 kcal/kg CBW d/t sepsis)

Estimated Protein Required (g/day) 

      (1.5-2 gm/kg CBW d/t sepsis)

Estimated Fluid Required (l/day) 

     Per physician d/t CHF



Problem/Etiology/Signs/Symptoms 

     Increased nutritional needs R/T metabolic demands AEB estimated nutritional 
requirements for sepsis.

     *Ongoing

     Altered nutrition-related labs R/T endocrine dysfunction AEB elevated BG lab values.

     *Ongoing

Expected Outcomes/Goals 

     - Monitor provision of EN support w/ goal of pt meeting at least 75% of 

     estimated nutritional needs, labs trending WNL, normal GI function, and 

     skin integrity/wt maintenance, weaning status

Dietitian Recommendations 

     * Continue pureed, CCHO low carb-45 gm, cardiac, NTL diet

     (ONS Glucerna TID comes standard w/ this diabetic pureed diet; ONS yields 660 kcal/day, 
30 gm protein/day)

     * Consider advance diet texture per physician

Follow Up 

     High Risk: F/U in 2-3 days

## 2022-02-24 NOTE — NUR
PT TO TELE:



PT TRANSFERRED TO TELE. REPORT GIVEN TO RN USING SBAR REPORTING. ALL SAFETY PRECAUTIONS 
ENFORCED. WIFE UPDATED.

## 2022-02-24 NOTE — NUR
Dietitian Recommendations 



     * Continue pureed, CCHO low carb-45 gm, cardiac, NTL diet

     (ONS Glucerna TID comes standard w/ this diabetic pureed diet; ONS yields 660 kcal/day, 
30 gm protein/day)

     * Consider advance diet texture per physician

LP, RD



Please refer to Nutrition F/U for details.

## 2022-02-25 VITALS — SYSTOLIC BLOOD PRESSURE: 108 MMHG

## 2022-02-25 VITALS — SYSTOLIC BLOOD PRESSURE: 117 MMHG

## 2022-02-25 VITALS — SYSTOLIC BLOOD PRESSURE: 120 MMHG

## 2022-02-25 VITALS — SYSTOLIC BLOOD PRESSURE: 118 MMHG

## 2022-02-25 VITALS — SYSTOLIC BLOOD PRESSURE: 119 MMHG

## 2022-02-25 LAB
ALBUMIN SERPL BCP-MCNC: 2.2 G/DL (ref 3.4–4.8)
ALT SERPL W P-5'-P-CCNC: 66 U/L (ref 12–78)
ANION GAP SERPL CALCULATED.3IONS-SCNC: 6 MMOL/L (ref 5–15)
AST SERPL W P-5'-P-CCNC: 25 U/L (ref 10–37)
BASOPHILS # BLD AUTO: 0.1 K/UL (ref 0–0.2)
BASOPHILS NFR BLD AUTO: 0.6 % (ref 0–2)
BILIRUB SERPL-MCNC: 0.9 MG/DL (ref 0–1)
BUN SERPL-MCNC: 27 MG/DL (ref 8–21)
CALCIUM SERPL-MCNC: 7.1 MG/DL (ref 8.4–11)
CHLORIDE SERPL-SCNC: 110 MMOL/L (ref 98–107)
CREAT SERPL-MCNC: 0.38 MG/DL (ref 0.55–1.3)
EOSINOPHIL # BLD AUTO: 0 K/UL (ref 0–0.4)
EOSINOPHIL NFR BLD AUTO: 0.1 % (ref 0–4)
ERYTHROCYTE [DISTWIDTH] IN BLOOD BY AUTOMATED COUNT: 24 % (ref 9–15)
GFR SERPL CREATININE-BSD FRML MDRD: (no result) ML/MIN (ref 90–?)
GLUCOSE SERPL-MCNC: 87 MG/DL (ref 70–99)
HCT VFR BLD AUTO: 32.7 % (ref 36–54)
HGB BLD-MCNC: 9.9 G/DL (ref 14–18)
LYMPHOCYTES # BLD AUTO: 1.2 K/UL (ref 1–5.5)
LYMPHOCYTES NFR BLD AUTO: 8.6 % (ref 20.5–51.5)
MCH RBC QN AUTO: 21 PG (ref 27–31)
MCHC RBC AUTO-ENTMCNC: 30 % (ref 32–36)
MCV RBC AUTO: 71 FL (ref 79–98)
MONOCYTES # BLD MANUAL: 0.2 K/UL (ref 0–1)
MONOCYTES # BLD MANUAL: 1.5 % (ref 1.7–9.3)
NEUTROPHILS # BLD AUTO: 12.1 K/UL (ref 1.8–7.7)
NEUTROPHILS NFR BLD AUTO: 89.2 % (ref 40–70)
PLATELET # BLD AUTO: 185 K/UL (ref 130–430)
POTASSIUM SERPL-SCNC: 3.5 MMOL/L (ref 3.5–5.1)
RBC # BLD AUTO: 4.62 MIL/UL (ref 4.2–6.2)
SODIUM SERPLBLD-SCNC: 142 MMOL/L (ref 136–145)
WBC # BLD AUTO: 13.5 K/UL (ref 4.8–10.8)

## 2022-02-25 RX ADMIN — SACUBITRIL AND VALSARTAN SCH TABLET: 24; 26 TABLET, FILM COATED ORAL at 13:23

## 2022-02-25 RX ADMIN — CARVEDILOL SCH MG: 6.25 TABLET, FILM COATED ORAL at 10:12

## 2022-02-25 RX ADMIN — Medication SCH UNIT: at 10:12

## 2022-02-25 RX ADMIN — ALBUTEROL SULFATE SCH PUFFS: 90 AEROSOL, METERED RESPIRATORY (INHALATION) at 14:15

## 2022-02-25 RX ADMIN — METHYLPREDNISOLONE SCH MG: 40 INJECTION, POWDER, LYOPHILIZED, FOR SOLUTION INTRAMUSCULAR; INTRAVENOUS at 21:07

## 2022-02-25 RX ADMIN — SODIUM CHLORIDE SCH MLS/HR: 450 INJECTION, SOLUTION INTRAVENOUS at 21:22

## 2022-02-25 RX ADMIN — Medication SCH EA: at 17:31

## 2022-02-25 RX ADMIN — OXYCODONE HYDROCHLORIDE AND ACETAMINOPHEN SCH MG: 500 TABLET ORAL at 10:12

## 2022-02-25 RX ADMIN — ALBUTEROL SULFATE SCH PUFFS: 90 AEROSOL, METERED RESPIRATORY (INHALATION) at 07:35

## 2022-02-25 RX ADMIN — ENOXAPARIN SODIUM SCH MG: 40 INJECTION SUBCUTANEOUS at 10:15

## 2022-02-25 RX ADMIN — Medication SCH EA: at 00:03

## 2022-02-25 RX ADMIN — FUROSEMIDE SCH MG: 10 INJECTION, SOLUTION INTRAMUSCULAR; INTRAVENOUS at 10:11

## 2022-02-25 RX ADMIN — METHYLPREDNISOLONE SCH MG: 40 INJECTION, POWDER, LYOPHILIZED, FOR SOLUTION INTRAMUSCULAR; INTRAVENOUS at 05:49

## 2022-02-25 RX ADMIN — INSULIN GLARGINE SCH UNITS: 100 INJECTION, SOLUTION SUBCUTANEOUS at 10:16

## 2022-02-25 RX ADMIN — Medication SCH EA: at 13:33

## 2022-02-25 RX ADMIN — SODIUM CHLORIDE SCH MG: 9 INJECTION, SOLUTION INTRAVENOUS at 10:11

## 2022-02-25 RX ADMIN — ALBUTEROL SULFATE SCH PUFFS: 90 AEROSOL, METERED RESPIRATORY (INHALATION) at 00:16

## 2022-02-25 RX ADMIN — METHYLPREDNISOLONE SCH MG: 40 INJECTION, POWDER, LYOPHILIZED, FOR SOLUTION INTRAMUSCULAR; INTRAVENOUS at 13:22

## 2022-02-25 RX ADMIN — SACUBITRIL AND VALSARTAN SCH TABLET: 24; 26 TABLET, FILM COATED ORAL at 21:07

## 2022-02-25 RX ADMIN — CARVEDILOL SCH MG: 6.25 TABLET, FILM COATED ORAL at 21:06

## 2022-02-25 RX ADMIN — SODIUM CHLORIDE SCH MLS/HR: 450 INJECTION, SOLUTION INTRAVENOUS at 00:15

## 2022-02-25 RX ADMIN — Medication SCH EA: at 05:47

## 2022-02-25 NOTE — NUR
Closing notes:

Pt A/Ox2 resting in bed. NO s/s of respiratory or cardiac distress, NC running at 5L, SPO2 
92%. Right IJ site is clean, dry and intact with ordered fluids running. Fall, safety, 
isolation and aspiration precautions are in place, call light is within reach, will endorse 
to night shift.

## 2022-02-25 NOTE — NUR
40 MEQ POTASSIUM GIVEN AS ORDERED BY BEVERLEY LEIJA. IN ORDER HISTORY SHOWS THAT THEIRS ANOTHER 
ORDER 2O MEQ AS PER PHARMACIST THATS THE ONE THAT WAS PULLED OUT IN THE MEDICATION CABINET.
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
## 2022-02-26 VITALS — SYSTOLIC BLOOD PRESSURE: 112 MMHG

## 2022-02-26 VITALS — SYSTOLIC BLOOD PRESSURE: 117 MMHG

## 2022-02-26 VITALS — SYSTOLIC BLOOD PRESSURE: 115 MMHG

## 2022-02-26 VITALS — SYSTOLIC BLOOD PRESSURE: 116 MMHG

## 2022-02-26 VITALS — SYSTOLIC BLOOD PRESSURE: 152 MMHG

## 2022-02-26 RX ADMIN — SODIUM CHLORIDE SCH MG: 9 INJECTION, SOLUTION INTRAVENOUS at 09:23

## 2022-02-26 RX ADMIN — Medication SCH UNIT: at 09:26

## 2022-02-26 RX ADMIN — CARVEDILOL SCH MG: 6.25 TABLET, FILM COATED ORAL at 09:25

## 2022-02-26 RX ADMIN — METHYLPREDNISOLONE SCH MG: 40 INJECTION, POWDER, LYOPHILIZED, FOR SOLUTION INTRAMUSCULAR; INTRAVENOUS at 06:01

## 2022-02-26 RX ADMIN — ENOXAPARIN SODIUM SCH MG: 40 INJECTION SUBCUTANEOUS at 09:26

## 2022-02-26 RX ADMIN — Medication SCH EA: at 11:37

## 2022-02-26 RX ADMIN — FUROSEMIDE SCH MG: 10 INJECTION, SOLUTION INTRAMUSCULAR; INTRAVENOUS at 09:24

## 2022-02-26 RX ADMIN — ALBUTEROL SULFATE SCH PUFFS: 90 AEROSOL, METERED RESPIRATORY (INHALATION) at 19:05

## 2022-02-26 RX ADMIN — SODIUM CHLORIDE SCH MLS/HR: 450 INJECTION, SOLUTION INTRAVENOUS at 16:15

## 2022-02-26 RX ADMIN — SACUBITRIL AND VALSARTAN SCH TABLET: 24; 26 TABLET, FILM COATED ORAL at 20:35

## 2022-02-26 RX ADMIN — Medication SCH EA: at 17:07

## 2022-02-26 RX ADMIN — METHYLPREDNISOLONE SCH MG: 40 INJECTION, POWDER, LYOPHILIZED, FOR SOLUTION INTRAMUSCULAR; INTRAVENOUS at 14:02

## 2022-02-26 RX ADMIN — OXYCODONE HYDROCHLORIDE AND ACETAMINOPHEN SCH MG: 500 TABLET ORAL at 09:27

## 2022-02-26 RX ADMIN — CARVEDILOL SCH MG: 6.25 TABLET, FILM COATED ORAL at 20:34

## 2022-02-26 RX ADMIN — SACUBITRIL AND VALSARTAN SCH TABLET: 24; 26 TABLET, FILM COATED ORAL at 09:27

## 2022-02-26 RX ADMIN — ALBUTEROL SULFATE SCH PUFFS: 90 AEROSOL, METERED RESPIRATORY (INHALATION) at 08:20

## 2022-02-26 RX ADMIN — METHYLPREDNISOLONE SCH MG: 40 INJECTION, POWDER, LYOPHILIZED, FOR SOLUTION INTRAMUSCULAR; INTRAVENOUS at 20:35

## 2022-02-26 RX ADMIN — INSULIN GLARGINE SCH UNITS: 100 INJECTION, SOLUTION SUBCUTANEOUS at 11:45

## 2022-02-26 RX ADMIN — Medication SCH EA: at 06:00

## 2022-02-26 NOTE — NUR
Notes

Awake, O2 sat at 96% in 5L, blood sugar is 220. Repositioned for comfort. help patient to 
use insetive spirometer.

## 2022-02-26 NOTE — NUR
NOTES

PATIENT, ASLEEP. O2 SAT AT 98% AT 4L NOW. PATIENT NOT REALLY EATING BUT DRINKING HIS 
GLUCERNA. REPOSITIONED.

## 2022-02-26 NOTE — NUR
CLOSING NOTES

Sleepy but arousable, o2 sat 99% in 4L. No distress noted. Turned and repositioned. IVF 
infusing well.

## 2022-02-27 VITALS — SYSTOLIC BLOOD PRESSURE: 120 MMHG

## 2022-02-27 VITALS — SYSTOLIC BLOOD PRESSURE: 125 MMHG

## 2022-02-27 VITALS — SYSTOLIC BLOOD PRESSURE: 122 MMHG

## 2022-02-27 VITALS — SYSTOLIC BLOOD PRESSURE: 126 MMHG

## 2022-02-27 VITALS — SYSTOLIC BLOOD PRESSURE: 118 MMHG

## 2022-02-27 LAB
ALBUMIN SERPL BCP-MCNC: 2.3 G/DL (ref 3.4–4.8)
ALT SERPL W P-5'-P-CCNC: 49 U/L (ref 12–78)
ANION GAP SERPL CALCULATED.3IONS-SCNC: 11 MMOL/L (ref 5–15)
AST SERPL W P-5'-P-CCNC: 17 U/L (ref 10–37)
BASOPHILS # BLD AUTO: 0.1 K/UL (ref 0–0.2)
BASOPHILS NFR BLD AUTO: 0.4 % (ref 0–2)
BILIRUB SERPL-MCNC: 0.8 MG/DL (ref 0–1)
BUN SERPL-MCNC: 20 MG/DL (ref 8–21)
CALCIUM SERPL-MCNC: 7.4 MG/DL (ref 8.4–11)
CHLORIDE SERPL-SCNC: 103 MMOL/L (ref 98–107)
CREAT SERPL-MCNC: 0.42 MG/DL (ref 0.55–1.3)
EOSINOPHIL # BLD AUTO: 0 K/UL (ref 0–0.4)
EOSINOPHIL NFR BLD AUTO: 0 % (ref 0–4)
ERYTHROCYTE [DISTWIDTH] IN BLOOD BY AUTOMATED COUNT: 24.6 % (ref 9–15)
GFR SERPL CREATININE-BSD FRML MDRD: (no result) ML/MIN (ref 90–?)
GLUCOSE SERPL-MCNC: 209 MG/DL (ref 70–99)
HCT VFR BLD AUTO: 36 % (ref 36–54)
HGB BLD-MCNC: 10.8 G/DL (ref 14–18)
LYMPHOCYTES # BLD AUTO: 1.2 K/UL (ref 1–5.5)
LYMPHOCYTES NFR BLD AUTO: 6.8 % (ref 20.5–51.5)
MCH RBC QN AUTO: 22 PG (ref 27–31)
MCHC RBC AUTO-ENTMCNC: 30 % (ref 32–36)
MCV RBC AUTO: 72 FL (ref 79–98)
MONOCYTES # BLD MANUAL: 0.7 K/UL (ref 0–1)
MONOCYTES # BLD MANUAL: 3.8 % (ref 1.7–9.3)
NEUTROPHILS # BLD AUTO: 15.3 K/UL (ref 1.8–7.7)
NEUTROPHILS NFR BLD AUTO: 89 % (ref 40–70)
PLATELET # BLD AUTO: 153 K/UL (ref 130–430)
POTASSIUM SERPL-SCNC: 3.5 MMOL/L (ref 3.5–5.1)
RBC # BLD AUTO: 5.04 MIL/UL (ref 4.2–6.2)
SODIUM SERPLBLD-SCNC: 138 MMOL/L (ref 136–145)
WBC # BLD AUTO: 17.2 K/UL (ref 4.8–10.8)

## 2022-02-27 RX ADMIN — SACUBITRIL AND VALSARTAN SCH TABLET: 24; 26 TABLET, FILM COATED ORAL at 20:28

## 2022-02-27 RX ADMIN — METHYLPREDNISOLONE SCH MG: 40 INJECTION, POWDER, LYOPHILIZED, FOR SOLUTION INTRAMUSCULAR; INTRAVENOUS at 21:00

## 2022-02-27 RX ADMIN — ALBUTEROL SULFATE SCH PUFFS: 90 AEROSOL, METERED RESPIRATORY (INHALATION) at 14:18

## 2022-02-27 RX ADMIN — METHYLPREDNISOLONE SCH MG: 40 INJECTION, POWDER, LYOPHILIZED, FOR SOLUTION INTRAMUSCULAR; INTRAVENOUS at 21:01

## 2022-02-27 RX ADMIN — Medication SCH UNIT: at 08:36

## 2022-02-27 RX ADMIN — METHYLPREDNISOLONE SCH MG: 40 INJECTION, POWDER, LYOPHILIZED, FOR SOLUTION INTRAMUSCULAR; INTRAVENOUS at 20:29

## 2022-02-27 RX ADMIN — ALBUTEROL SULFATE SCH PUFFS: 90 AEROSOL, METERED RESPIRATORY (INHALATION) at 00:00

## 2022-02-27 RX ADMIN — Medication SCH EA: at 05:12

## 2022-02-27 RX ADMIN — Medication SCH EA: at 00:27

## 2022-02-27 RX ADMIN — ENOXAPARIN SODIUM SCH MG: 40 INJECTION SUBCUTANEOUS at 08:38

## 2022-02-27 RX ADMIN — OXYCODONE HYDROCHLORIDE AND ACETAMINOPHEN SCH MG: 500 TABLET ORAL at 08:36

## 2022-02-27 RX ADMIN — SODIUM CHLORIDE SCH MG: 9 INJECTION, SOLUTION INTRAVENOUS at 08:35

## 2022-02-27 RX ADMIN — CARVEDILOL SCH MG: 6.25 TABLET, FILM COATED ORAL at 08:40

## 2022-02-27 RX ADMIN — Medication SCH EA: at 12:08

## 2022-02-27 RX ADMIN — Medication SCH EA: at 00:23

## 2022-02-27 RX ADMIN — CARVEDILOL SCH MG: 6.25 TABLET, FILM COATED ORAL at 20:28

## 2022-02-27 RX ADMIN — ALBUTEROL SULFATE SCH PUFFS: 90 AEROSOL, METERED RESPIRATORY (INHALATION) at 09:37

## 2022-02-27 RX ADMIN — INSULIN GLARGINE SCH UNITS: 100 INJECTION, SOLUTION SUBCUTANEOUS at 11:46

## 2022-02-27 RX ADMIN — FUROSEMIDE SCH MG: 10 INJECTION, SOLUTION INTRAMUSCULAR; INTRAVENOUS at 08:39

## 2022-02-27 RX ADMIN — METHYLPREDNISOLONE SCH MG: 40 INJECTION, POWDER, LYOPHILIZED, FOR SOLUTION INTRAMUSCULAR; INTRAVENOUS at 05:11

## 2022-02-27 RX ADMIN — Medication SCH EA: at 17:14

## 2022-02-27 RX ADMIN — ALBUTEROL SULFATE SCH PUFFS: 90 AEROSOL, METERED RESPIRATORY (INHALATION) at 19:20

## 2022-02-27 RX ADMIN — METHYLPREDNISOLONE SCH MG: 40 INJECTION, POWDER, LYOPHILIZED, FOR SOLUTION INTRAMUSCULAR; INTRAVENOUS at 14:05

## 2022-02-27 RX ADMIN — SACUBITRIL AND VALSARTAN SCH TABLET: 24; 26 TABLET, FILM COATED ORAL at 08:37

## 2022-02-27 NOTE — NUR
CM:

DCP TO [facility], SPOKE TO YOLANDA AT [facility],AND REQUESTED OPEN BED, SHE STATED 
THAT OFFICE WOULDN'T BE OPEN FOR REVIEW UNTIL TOMORROW, PT INFO FAXED [phone].

## 2022-02-27 NOTE — NUR
closing notes

Awake ad more alert and oriented, still need help on feeding. afebrile, Tolerating oxygen at 
4L. bed bath done for today. will endorse.

## 2022-02-27 NOTE — NUR
change of shift.pt.presents isolation status;droplet;2/t covid19+status.pt.presents.o2 
therapy via nasal cannulae.rate;4l/min  humidified.02-sat%=94%.pt.presents rt.inj iv access 
intact iv fluids infusing.pt.presents lorenzana cath intact;patent.pt.presents activity status 
bedrest.call light/telephone w/in access of the pt.

## 2022-02-27 NOTE — NUR
2100p medications administered.pt.capable to ingest the po medication w/out 
difficulty.pudding facilitated ingesting of the

po medications.no c/o pain,nausea.call light/telephone w/in access of the pt.

## 2022-02-27 NOTE — NUR
Sleepy but arousable, o2 sat 99% in 4L. No distress noted. diaper changed. Turned and 
repositioned. IVF infusing well. pt in stable condition

## 2022-02-27 NOTE — NUR
pt.assessed.v/s assessed values wnl.o2-sat%=94%.rt.inj iv access intact iv fluids 
infusing.lorenzana cath intact urine content present.no c/o pain,nausea..i have apprised the 
pt.that snacks/beverages are available w/in the shfit.no requests posited.

pt.assessed for cleanliness.pt.repositioned.call light/telephone placed w/in access of the 
pt.

## 2022-02-27 NOTE — NUR
Awake and sleeping in between the shifts, O2 sat at 96% in 5L, blood sugar is 210. 
Repositioned for comfort. changed linen and performed care.

## 2022-02-27 NOTE — NUR
pt.assessed.02-sat%=94%.pt.presents quiescent affect;calm,resting.no c/o pain,nausea.rt.inj 
iv access intact iv fluids infusing. fusing.lorenzana cath intact;patent urine content 
present.pt.assessed for cleanliness.pt.repositioned.call light/telephone placed w/in access 
of the pt.

## 2022-02-27 NOTE — NUR
Initial notes

Awake, alert talked to family via facetime. Denies any pain or shortness of breath. helped 
patient to use incentive spirometer. on O2 4L, 02 sat at 96%. IVF infusing well. afebrile. 
Bed alarm. maintain on contact isolation. Will monitor.

## 2022-02-28 VITALS — SYSTOLIC BLOOD PRESSURE: 114 MMHG

## 2022-02-28 VITALS — SYSTOLIC BLOOD PRESSURE: 112 MMHG

## 2022-02-28 VITALS — SYSTOLIC BLOOD PRESSURE: 110 MMHG

## 2022-02-28 VITALS — SYSTOLIC BLOOD PRESSURE: 139 MMHG

## 2022-02-28 RX ADMIN — METHYLPREDNISOLONE SCH MG: 40 INJECTION, POWDER, LYOPHILIZED, FOR SOLUTION INTRAMUSCULAR; INTRAVENOUS at 09:52

## 2022-02-28 RX ADMIN — ALBUTEROL SULFATE SCH PUFFS: 90 AEROSOL, METERED RESPIRATORY (INHALATION) at 18:00

## 2022-02-28 RX ADMIN — Medication SCH EA: at 00:33

## 2022-02-28 RX ADMIN — CARVEDILOL SCH MG: 6.25 TABLET, FILM COATED ORAL at 21:21

## 2022-02-28 RX ADMIN — FUROSEMIDE SCH MG: 40 TABLET ORAL at 09:54

## 2022-02-28 RX ADMIN — Medication SCH EA: at 12:00

## 2022-02-28 RX ADMIN — Medication SCH EA: at 18:00

## 2022-02-28 RX ADMIN — ALBUTEROL SULFATE SCH PUFFS: 90 AEROSOL, METERED RESPIRATORY (INHALATION) at 01:10

## 2022-02-28 RX ADMIN — SODIUM CHLORIDE SCH MG: 9 INJECTION, SOLUTION INTRAVENOUS at 09:51

## 2022-02-28 RX ADMIN — CARVEDILOL SCH MG: 6.25 TABLET, FILM COATED ORAL at 09:59

## 2022-02-28 RX ADMIN — INSULIN GLARGINE SCH UNITS: 100 INJECTION, SOLUTION SUBCUTANEOUS at 09:48

## 2022-02-28 RX ADMIN — OXYCODONE HYDROCHLORIDE AND ACETAMINOPHEN SCH MG: 500 TABLET ORAL at 09:54

## 2022-02-28 RX ADMIN — Medication SCH EA: at 06:47

## 2022-02-28 RX ADMIN — ALBUTEROL SULFATE SCH PUFFS: 90 AEROSOL, METERED RESPIRATORY (INHALATION) at 07:45

## 2022-02-28 RX ADMIN — SACUBITRIL AND VALSARTAN SCH TABLET: 24; 26 TABLET, FILM COATED ORAL at 10:00

## 2022-02-28 RX ADMIN — ENOXAPARIN SODIUM SCH MG: 40 INJECTION SUBCUTANEOUS at 09:51

## 2022-02-28 RX ADMIN — ALBUTEROL SULFATE SCH PUFFS: 90 AEROSOL, METERED RESPIRATORY (INHALATION) at 11:38

## 2022-02-28 RX ADMIN — SACUBITRIL AND VALSARTAN SCH TABLET: 24; 26 TABLET, FILM COATED ORAL at 21:21

## 2022-02-28 RX ADMIN — METHYLPREDNISOLONE SCH MG: 40 INJECTION, POWDER, LYOPHILIZED, FOR SOLUTION INTRAMUSCULAR; INTRAVENOUS at 21:00

## 2022-02-28 RX ADMIN — Medication SCH UNIT: at 09:54

## 2022-02-28 NOTE — NUR
pt.assessed.pt.presents quiescent affect;calm,somnolent.per flacc pain mgx pt.absent facial 
grimaces/body posturing.rt.inj

iv access intact iv fluids infusing.02-sat%=94%.pt.assessed for 
cleanliness.pt.repositioned.call light/telephone placed w/in

access of the pt.

## 2022-02-28 NOTE — NUR
Nutrition F/U



RD reviewed pt's current EMR record including diet Hx, physician notes, nursing notes, 
pertinent labs/meds/procedures, care trends, and care activity.



Admitting Diagnosis 

     CHF, influenza, COVID

Medical History Comment: 

     DM, PE in R arm, RA, and gout per physician notes

     

     Pt also found w/ septic shock per physician notes

     

     SARS-CoV-2 Ag (Rapid) Positive 2/11, 2/28 & Influenza B Positive 2/11



Subjective Information: 

2/23  S/P Extubation

2/23  ST Swallow evaluation rec for puree/NTL diet w/ 1:1 feeder and full aspiration 
precautions



RD bedside visit deferred. Per EMR review, pt is on 4 L O2 via NC; Dwayne scale: 19, 
erythema at sacrum ; PO intake 42% x3 meal records; last BM 2/27 x1

      

Current Diet Order/Nutrition Support: Pureed, CCHO low carb-45, cardiac, NTL diet x3 days



Patient/Significant Other 

     Unable To Verbalize

Education Provided 

     Not Indicated

Pertinent Medications 

     lantus, zinc, VIT C, protonix IV, lasix, lovenox, solu-medrol, Vit D3

Pertinent Labs 

     (2/27) WBC 17.2 H,  H, POC  H

Height (Feet) 

     5 feet

Height (Inches) 

     5.00 inches

Weight (Pounds) 

     141 pounds

Weight (Calculated Kilograms) 

     63.601876 kilograms

Patient Weight

     63.957 kg -- stable since 2/13

Body Mass Index

     23.46 kg/m2

%IBW 

     104

Ideal/Adjusted Body Weight 

     136#/62 kg

Recent Weight Change 

     Unable to verify

Weight Status 

     Appropriate

Food Allergies 

     Unable to verify

NEW Estimated Energy Expenditure (kcals/day) 

     0495-5183 (30-35 kcal/kg CBW d/t sepsis)

Estimated Protein Required (g/day) 

      (1.5-2 gm/kg CBW d/t sepsis)

Estimated Fluid Required (l/day) 

     Per physician d/t CHF



Problem/Etiology/Signs/Symptoms 

     Increased nutritional needs R/T metabolic demands AEB estimated nutritional 
requirements for sepsis.

     *Ongoing

     Altered nutrition-related labs R/T endocrine dysfunction AEB elevated BG lab values.

     *Ongoing

Expected Outcomes/Goals 

     - Monitor provision of EN support w/ goal of pt meeting at least 75% of 

     estimated nutritional needs, labs trending WNL, normal GI function, and 

     skin integrity/wt maintenance, weaning status

Dietitian Recommendations 

     * Continue pureed, CCHO low carb-45 gm, cardiac, NTL diet

     (ONS Glucerna TID comes standard w/ this diabetic pureed diet; ONS yields 660 kcal/day, 
30 gm protein/day)

     * Consider advance diet texture per physician

Follow Up 

     High Risk: F/U in 2-3 days

## 2022-02-28 NOTE — NUR
pt.assessed.blood glucose assessed value;157mg/dl.iv access intact iv fluids infusing.lorenzana 
cath intact urine content present.

no c/o pain,nausea.02-sat5=94%.i have weighed the pt.2/t chf/lasix 
administration.pt.repositioned.call light/telephone placed

w/in access of the pt.

## 2022-02-28 NOTE — NUR
SECOND REQUEST FOR REGISTRY KALIN BISHOP TO PUT BATTERY IN TELE MONITOR  UNABLE TO MONITOR THE 
PATIENT SINCE 1700

## 2022-02-28 NOTE — NUR
pt.assessed.pt.assessed for cleanliness.pt.cleaned/repositioned.no c/o pain,nausea.no 
requests posited@this hour.rt.inj iv 

access intact iv fluids infusing.lorenzana cath intact;patent urine content 
present.02-sat%=94%.call light/telephone placed w/in

access of the pt.

## 2022-02-28 NOTE — NUR
DISCHARGE PLANNING

Order for dc planning for SNF. Called & spoke with wife Jama Isabel, ph [phone], & 
she also put in pt's dtr Concepcion on ph. Explained SNF to wife & dtr and agreeable with short 
term SNF. Will look into [facility] but also looking into other SNF's. Informed 
them of choices & dtr going to look SNF's up at Medicare.gov. Pt Covid + 17days ago, order 
for rapid Covid to open options to SNF's if Covid neg. Per dtr pt is fully vaccinated 
including Booster, all with Pfizer: #1 Jan 2021, #2 Feb 2021, Booster Sept or Oct 2021. Per 
dtr pt also has his flu vaccine. Family will look up SNF's then let us know preference.

-------------------------------------------------------------------------------

Addendum: 02/28/22 at 1614 by Nancy Reyes RN

-------------------------------------------------------------------------------

Called & spoke with wife regarding SNF preference. States will call back with preference 
when gets home. Explained Medicare IMM & gave her ph #, verbalized understanding. Received 
call from Concepcion & wife with preference. Preference is TCU at [facility]. States in case do not 
accept to send also to: [facility], [facility], [facility], [facility], & [facility]. States will determine preference depending on who accepts pt. 
Faxed referral to all the listed facilities. Left msg with Sabrina at [facility], ph [phone] fax 
[phone].

## 2022-02-28 NOTE — NUR
pt.assessed.v/s assessed values wnl.no c/o pain,nausea.iv access intact iv fluids 
infusing.o2-sat%=94%.blood glucose assessed value:212mg/dl.note no md order;sliding scale to 
f/u in am if md to order sliding scale.pt.assessed for cleanliness pt.repositioned. call 
light/telephone placed w/in access of the pt.

-------------------------------------------------------------------------------

Addendum: 02/28/22 at 0132 by Jesse Morrow RN

-------------------------------------------------------------------------------

salomón blackburn intact;patent content present.

## 2022-02-28 NOTE — NUR
Patient VSS, episodic bradycardia noted. Covid re-test rapid preformed, patient still 
remains covid positive, discussed with case management regarding facility placement. Able to 
preform care X 1 asssist, able to feed self, tolerating puree diet. R triple IJ remains 
intact. Lassiter care preformed and secured. Safety maintained, will continue to monitor.

## 2022-03-01 VITALS — SYSTOLIC BLOOD PRESSURE: 131 MMHG

## 2022-03-01 VITALS — SYSTOLIC BLOOD PRESSURE: 122 MMHG

## 2022-03-01 VITALS — SYSTOLIC BLOOD PRESSURE: 116 MMHG

## 2022-03-01 VITALS — SYSTOLIC BLOOD PRESSURE: 139 MMHG

## 2022-03-01 VITALS — SYSTOLIC BLOOD PRESSURE: 112 MMHG

## 2022-03-01 LAB
ANION GAP SERPL CALCULATED.3IONS-SCNC: 10 MMOL/L (ref 5–15)
BASOPHILS # BLD AUTO: 0 K/UL (ref 0–0.2)
BASOPHILS NFR BLD AUTO: 0.3 % (ref 0–2)
BUN SERPL-MCNC: 18 MG/DL (ref 8–21)
CALCIUM SERPL-MCNC: 7.4 MG/DL (ref 8.4–11)
CHLORIDE SERPL-SCNC: 100 MMOL/L (ref 98–107)
CREAT SERPL-MCNC: 0.28 MG/DL (ref 0.55–1.3)
EOSINOPHIL # BLD AUTO: 0 K/UL (ref 0–0.4)
EOSINOPHIL NFR BLD AUTO: 0.1 % (ref 0–4)
ERYTHROCYTE [DISTWIDTH] IN BLOOD BY AUTOMATED COUNT: 24.4 % (ref 9–15)
GFR SERPL CREATININE-BSD FRML MDRD: (no result) ML/MIN (ref 90–?)
GLUCOSE SERPL-MCNC: 139 MG/DL (ref 70–99)
HCT VFR BLD AUTO: 39.1 % (ref 36–54)
HGB BLD-MCNC: 11.8 G/DL (ref 14–18)
LYMPHOCYTES # BLD AUTO: 1 K/UL (ref 1–5.5)
LYMPHOCYTES NFR BLD AUTO: 11.1 % (ref 20.5–51.5)
MCH RBC QN AUTO: 22 PG (ref 27–31)
MCHC RBC AUTO-ENTMCNC: 30 % (ref 32–36)
MCV RBC AUTO: 72 FL (ref 79–98)
MONOCYTES # BLD MANUAL: 0.5 K/UL (ref 0–1)
MONOCYTES # BLD MANUAL: 5.3 % (ref 1.7–9.3)
NEUTROPHILS # BLD AUTO: 7.8 K/UL (ref 1.8–7.7)
NEUTROPHILS NFR BLD AUTO: 83.2 % (ref 40–70)
PLATELET # BLD AUTO: 131 K/UL (ref 130–430)
POTASSIUM SERPL-SCNC: 2.9 MMOL/L (ref 3.5–5.1)
RBC # BLD AUTO: 5.47 MIL/UL (ref 4.2–6.2)
SODIUM SERPLBLD-SCNC: 136 MMOL/L (ref 136–145)
WBC # BLD AUTO: 9.4 K/UL (ref 4.8–10.8)

## 2022-03-01 RX ADMIN — FUROSEMIDE SCH MG: 40 TABLET ORAL at 09:11

## 2022-03-01 RX ADMIN — ENOXAPARIN SODIUM SCH MG: 40 INJECTION SUBCUTANEOUS at 09:13

## 2022-03-01 RX ADMIN — ALBUTEROL SULFATE SCH PUFFS: 90 AEROSOL, METERED RESPIRATORY (INHALATION) at 07:15

## 2022-03-01 RX ADMIN — SODIUM CHLORIDE SCH MG: 9 INJECTION, SOLUTION INTRAVENOUS at 09:11

## 2022-03-01 RX ADMIN — OXYCODONE HYDROCHLORIDE AND ACETAMINOPHEN SCH MG: 500 TABLET ORAL at 09:13

## 2022-03-01 RX ADMIN — METHYLPREDNISOLONE SCH MG: 40 INJECTION, POWDER, LYOPHILIZED, FOR SOLUTION INTRAMUSCULAR; INTRAVENOUS at 09:11

## 2022-03-01 RX ADMIN — SACUBITRIL AND VALSARTAN SCH TABLET: 24; 26 TABLET, FILM COATED ORAL at 11:27

## 2022-03-01 RX ADMIN — CARVEDILOL SCH MG: 6.25 TABLET, FILM COATED ORAL at 21:00

## 2022-03-01 RX ADMIN — CARVEDILOL SCH MG: 6.25 TABLET, FILM COATED ORAL at 09:15

## 2022-03-01 RX ADMIN — METHYLPREDNISOLONE SCH MG: 40 INJECTION, POWDER, LYOPHILIZED, FOR SOLUTION INTRAMUSCULAR; INTRAVENOUS at 22:12

## 2022-03-01 RX ADMIN — INSULIN GLARGINE SCH UNITS: 100 INJECTION, SOLUTION SUBCUTANEOUS at 10:04

## 2022-03-01 RX ADMIN — ALBUTEROL SULFATE SCH PUFFS: 90 AEROSOL, METERED RESPIRATORY (INHALATION) at 00:10

## 2022-03-01 RX ADMIN — Medication SCH EA: at 06:21

## 2022-03-01 RX ADMIN — Medication SCH UNIT: at 09:11

## 2022-03-01 RX ADMIN — Medication SCH EA: at 06:00

## 2022-03-01 RX ADMIN — Medication SCH EA: at 17:30

## 2022-03-01 RX ADMIN — ALBUTEROL SULFATE SCH PUFFS: 90 AEROSOL, METERED RESPIRATORY (INHALATION) at 11:58

## 2022-03-01 RX ADMIN — ALBUTEROL SULFATE SCH PUFFS: 90 AEROSOL, METERED RESPIRATORY (INHALATION) at 20:03

## 2022-03-01 RX ADMIN — SACUBITRIL AND VALSARTAN SCH TABLET: 24; 26 TABLET, FILM COATED ORAL at 22:13

## 2022-03-01 RX ADMIN — Medication SCH EA: at 11:44

## 2022-03-01 NOTE — NUR
HIGH ALERT NOTE:

Called Dr. VIZCARRA back at [phone] identified within the medical roster to verify physician 
authenticity POTASSIUM 60 MG .

## 2022-03-01 NOTE — NUR
GAVE REPORT TO CHARGE NURSE GABINO BECAUSE THE NURSE FOR THE PT WAS BUSY.  REPORT WAS ALSO 
GIVEN TO TRANSPORT.  PT STABLE AND ALERT. PT HASN'T TAKEN ANYTHING BY MOUTH AND HE IS 
LEAVING WITH HIS TRIPLE LUMEN CENTRAL LINE AND MEZA CATHETER INTACT

## 2022-03-01 NOTE — NUR
CALLED [facility] 



ELYSE WITH CHARGE NURSE AND SHE SAID THAT THERE PT IS NOT GETTING DC TILL 1900 ADN THEY 
NEED OUR  TO BE 2100 [facility]  [facility] 2100 SPOKE WITH MARKELL

## 2022-03-01 NOTE — NUR
***** PHYSICAL THERAPY CO-SIGN *****

The Physical Therapy Progress Notes documented by Physical Therapy Assistant have been 
reviewed.



Reviewed/Co-Signed by:  Ede Vilchis

Documentation Done by: JASPER POWERS PTA

-------------------------------------------------------------------------------

Addendum: 03/01/22 at 0821 by Ede Vilchis PT

-------------------------------------------------------------------------------

Amended: Links added.

## 2022-03-01 NOTE — NUR
Follow up call made to [facility], s/w Nina, she said the crew will be here in 10 
minutes.

## 2022-03-01 NOTE — NUR
CM:

PT ACCEPTED AT [facility], WILL BE GOING TO [id number], CALL REPORT [phone], 
TRANSPORT ARRANGED FOR 1900P WITH [facility], NURSE BUNNY GAGNON AWARE, 
PACKET TAKEN TO FLOOR.

## 2022-03-01 NOTE — NUR
Received critical lab report of potassium of 2.9 from KALIN Cabrales per MD's order will 
administer medication, will wait for [facility] to verify medication. Will reinforce if needed 
throughout the shift.

## 2022-03-02 NOTE — NUR
***** PHYSICAL THERAPY CO-SIGN *****

The Physical Therapy Progress Notes documented by Physical Therapy Assistant have been 
reviewed.



Reviewed/Co-Signed by:  Ede Vilchis

Documentation Done by: JASPER POWERS PTA

-------------------------------------------------------------------------------

Addendum: 03/02/22 at 0723 by Ede Vilchis PT

-------------------------------------------------------------------------------

Amended: Links added.